# Patient Record
Sex: FEMALE | Race: WHITE | Employment: OTHER
[De-identification: names, ages, dates, MRNs, and addresses within clinical notes are randomized per-mention and may not be internally consistent; named-entity substitution may affect disease eponyms.]

---

## 2017-03-10 ENCOUNTER — OFFICE VISIT (OUTPATIENT)
Dept: FAMILY MEDICINE CLINIC | Facility: CLINIC | Age: 57
End: 2017-03-10

## 2017-03-10 ENCOUNTER — HOSPITAL ENCOUNTER (OUTPATIENT)
Age: 57
Discharge: HOME | End: 2017-03-10
Payer: MEDICARE

## 2017-03-10 VITALS
HEIGHT: 62 IN | TEMPERATURE: 97.2 F | WEIGHT: 181 LBS | BODY MASS INDEX: 33.31 KG/M2 | HEART RATE: 85 BPM | DIASTOLIC BLOOD PRESSURE: 62 MMHG | SYSTOLIC BLOOD PRESSURE: 126 MMHG

## 2017-03-10 DIAGNOSIS — R53.83 FATIGUE, UNSPECIFIED TYPE: ICD-10-CM

## 2017-03-10 DIAGNOSIS — M79.7 FIBROMYALGIA: ICD-10-CM

## 2017-03-10 DIAGNOSIS — R53.83 FATIGUE, UNSPECIFIED TYPE: Primary | ICD-10-CM

## 2017-03-10 LAB
GLUCOSE BLD-MCNC: 124 MG/DL
THYROXINE, FREE: 0.85 NG/DL (ref 0.93–1.7)
TSH SERPL DL<=0.05 MIU/L-ACNC: 1.7 MIU/L (ref 0.3–5)
VITAMIN D 25-HYDROXY: 17.7 NG/ML (ref 30–100)

## 2017-03-10 PROCEDURE — 84439 ASSAY OF FREE THYROXINE: CPT

## 2017-03-10 PROCEDURE — 82306 VITAMIN D 25 HYDROXY: CPT

## 2017-03-10 PROCEDURE — 99213 OFFICE O/P EST LOW 20 MIN: CPT | Performed by: FAMILY MEDICINE

## 2017-03-10 PROCEDURE — 84443 ASSAY THYROID STIM HORMONE: CPT

## 2017-03-10 PROCEDURE — 36415 COLL VENOUS BLD VENIPUNCTURE: CPT

## 2017-03-10 PROCEDURE — 82962 GLUCOSE BLOOD TEST: CPT | Performed by: FAMILY MEDICINE

## 2017-03-10 RX ORDER — RABEPRAZOLE SODIUM 20 MG/1
20 TABLET, DELAYED RELEASE ORAL DAILY
Qty: 30 TABLET | Refills: 2 | Status: CANCELLED | OUTPATIENT
Start: 2017-03-10

## 2017-03-10 RX ORDER — RABEPRAZOLE SODIUM 20 MG/1
20 TABLET, DELAYED RELEASE ORAL DAILY
Qty: 30 TABLET | Refills: 2 | Status: SHIPPED | OUTPATIENT
Start: 2017-03-10 | End: 2017-07-06 | Stop reason: ALTCHOICE

## 2017-03-10 RX ORDER — GABAPENTIN 100 MG/1
100 CAPSULE ORAL DAILY
Qty: 30 CAPSULE | Refills: 1 | Status: SHIPPED | OUTPATIENT
Start: 2017-03-10 | End: 2017-04-06 | Stop reason: SINTOL

## 2017-03-10 ASSESSMENT — PATIENT HEALTH QUESTIONNAIRE - PHQ9
SUM OF ALL RESPONSES TO PHQ QUESTIONS 1-9: 0
2. FEELING DOWN, DEPRESSED OR HOPELESS: 0
SUM OF ALL RESPONSES TO PHQ9 QUESTIONS 1 & 2: 0
1. LITTLE INTEREST OR PLEASURE IN DOING THINGS: 0

## 2017-03-10 ASSESSMENT — ENCOUNTER SYMPTOMS
NAUSEA: 0
BACK PAIN: 1
SHORTNESS OF BREATH: 0
SORE THROAT: 0
ABDOMINAL PAIN: 0

## 2017-03-11 ENCOUNTER — TELEPHONE (OUTPATIENT)
Dept: FAMILY MEDICINE CLINIC | Age: 57
End: 2017-03-11

## 2017-04-05 ENCOUNTER — OFFICE VISIT (OUTPATIENT)
Dept: FAMILY MEDICINE CLINIC | Age: 57
End: 2017-04-05
Payer: MEDICARE

## 2017-04-05 VITALS
HEIGHT: 62 IN | TEMPERATURE: 97.6 F | WEIGHT: 182 LBS | HEART RATE: 72 BPM | RESPIRATION RATE: 16 BRPM | DIASTOLIC BLOOD PRESSURE: 81 MMHG | BODY MASS INDEX: 33.49 KG/M2 | SYSTOLIC BLOOD PRESSURE: 150 MMHG | OXYGEN SATURATION: 98 %

## 2017-04-05 DIAGNOSIS — J06.9 UPPER RESPIRATORY TRACT INFECTION, UNSPECIFIED TYPE: ICD-10-CM

## 2017-04-05 DIAGNOSIS — R07.82 INTERCOSTAL PAIN: ICD-10-CM

## 2017-04-05 DIAGNOSIS — J44.9 CHRONIC OBSTRUCTIVE PULMONARY DISEASE, UNSPECIFIED COPD TYPE (HCC): Primary | ICD-10-CM

## 2017-04-05 DIAGNOSIS — R05.9 COUGH: ICD-10-CM

## 2017-04-05 PROCEDURE — 99214 OFFICE O/P EST MOD 30 MIN: CPT | Performed by: FAMILY MEDICINE

## 2017-04-05 PROCEDURE — 71020 CHG CHEST X-RAY 2 VW: CPT | Performed by: FAMILY MEDICINE

## 2017-04-05 RX ORDER — IPRATROPIUM BROMIDE AND ALBUTEROL SULFATE 2.5; .5 MG/3ML; MG/3ML
1 SOLUTION RESPIRATORY (INHALATION) ONCE
Status: COMPLETED | OUTPATIENT
Start: 2017-04-05 | End: 2017-04-05

## 2017-04-05 RX ORDER — PREDNISONE 50 MG/1
50 TABLET ORAL DAILY
Qty: 7 TABLET | Refills: 0 | Status: SHIPPED | OUTPATIENT
Start: 2017-04-05 | End: 2017-04-12

## 2017-04-05 RX ORDER — BENZONATATE 100 MG/1
100 CAPSULE ORAL 3 TIMES DAILY PRN
Qty: 30 CAPSULE | Refills: 1 | Status: SHIPPED | OUTPATIENT
Start: 2017-04-05 | End: 2017-04-15

## 2017-04-05 RX ORDER — AZITHROMYCIN 250 MG/1
TABLET, FILM COATED ORAL
Qty: 6 TABLET | Refills: 0 | Status: SHIPPED | OUTPATIENT
Start: 2017-04-05 | End: 2017-04-28 | Stop reason: ALTCHOICE

## 2017-04-05 RX ORDER — ALBUTEROL SULFATE 90 UG/1
2 AEROSOL, METERED RESPIRATORY (INHALATION) EVERY 6 HOURS PRN
Qty: 1 INHALER | Refills: 3 | Status: SHIPPED | OUTPATIENT
Start: 2017-04-05

## 2017-04-05 RX ADMIN — IPRATROPIUM BROMIDE AND ALBUTEROL SULFATE 1 AMPULE: 2.5; .5 SOLUTION RESPIRATORY (INHALATION) at 12:25

## 2017-04-05 ASSESSMENT — ENCOUNTER SYMPTOMS
EYES NEGATIVE: 1
SORE THROAT: 1
ALLERGIC/IMMUNOLOGIC NEGATIVE: 1
RHINORRHEA: 0
WHEEZING: 1
SHORTNESS OF BREATH: 1
CHEST TIGHTNESS: 1
GASTROINTESTINAL NEGATIVE: 1
COUGH: 1

## 2017-04-27 ENCOUNTER — HOSPITAL ENCOUNTER (EMERGENCY)
Age: 57
Discharge: HOME | End: 2017-04-27
Attending: EMERGENCY MEDICINE
Payer: COMMERCIAL

## 2017-04-27 ENCOUNTER — APPOINTMENT (OUTPATIENT)
Dept: CT IMAGING | Age: 57
End: 2017-04-27
Payer: COMMERCIAL

## 2017-04-27 ENCOUNTER — APPOINTMENT (OUTPATIENT)
Dept: GENERAL RADIOLOGY | Age: 57
End: 2017-04-27
Payer: COMMERCIAL

## 2017-04-27 VITALS
TEMPERATURE: 98 F | SYSTOLIC BLOOD PRESSURE: 140 MMHG | HEART RATE: 72 BPM | DIASTOLIC BLOOD PRESSURE: 75 MMHG | OXYGEN SATURATION: 97 % | RESPIRATION RATE: 16 BRPM | HEIGHT: 62 IN | WEIGHT: 182.6 LBS | BODY MASS INDEX: 33.6 KG/M2

## 2017-04-27 DIAGNOSIS — S43.401A SPRAIN OF RIGHT SHOULDER, UNSPECIFIED SHOULDER SPRAIN TYPE, INITIAL ENCOUNTER: ICD-10-CM

## 2017-04-27 DIAGNOSIS — S16.1XXA NECK STRAIN, INITIAL ENCOUNTER: Primary | ICD-10-CM
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
## 2017-04-27 DIAGNOSIS — S43.402A SPRAIN OF LEFT SHOULDER, UNSPECIFIED SHOULDER SPRAIN TYPE, INITIAL ENCOUNTER: ICD-10-CM

## 2017-04-27 LAB — GLUCOSE BLD-MCNC: 127 MG/DL (ref 65–105)

## 2017-04-27 PROCEDURE — 73020 X-RAY EXAM OF SHOULDER: CPT

## 2017-04-27 PROCEDURE — 6370000000 HC RX 637 (ALT 250 FOR IP): Performed by: EMERGENCY MEDICINE

## 2017-04-27 PROCEDURE — 82947 ASSAY GLUCOSE BLOOD QUANT: CPT

## 2017-04-27 PROCEDURE — 99284 EMERGENCY DEPT VISIT MOD MDM: CPT

## 2017-04-27 PROCEDURE — 72125 CT NECK SPINE W/O DYE: CPT

## 2017-04-27 PROCEDURE — 70450 CT HEAD/BRAIN W/O DYE: CPT

## 2017-04-27 RX ORDER — IBUPROFEN 400 MG/1
400 TABLET ORAL ONCE
Status: COMPLETED | OUTPATIENT
Start: 2017-04-27 | End: 2017-04-27

## 2017-04-27 RX ORDER — TIZANIDINE 2 MG/1
2 TABLET ORAL EVERY 8 HOURS PRN
Qty: 15 TABLET | Refills: 0 | Status: SHIPPED | OUTPATIENT
Start: 2017-04-27 | End: 2017-04-28 | Stop reason: ALTCHOICE

## 2017-04-27 RX ORDER — HYDROCODONE BITARTRATE AND ACETAMINOPHEN 5; 325 MG/1; MG/1
1 TABLET ORAL EVERY 6 HOURS PRN
Qty: 10 TABLET | Refills: 0 | Status: SHIPPED | OUTPATIENT
Start: 2017-04-27 | End: 2017-04-28 | Stop reason: ALTCHOICE

## 2017-04-27 RX ADMIN — IBUPROFEN 400 MG: 400 TABLET ORAL at 14:39

## 2017-04-27 ASSESSMENT — PAIN SCALES - WONG BAKER: WONGBAKER_NUMERICALRESPONSE: 6

## 2017-04-27 ASSESSMENT — PAIN DESCRIPTION - FREQUENCY: FREQUENCY: CONTINUOUS

## 2017-04-27 ASSESSMENT — ENCOUNTER SYMPTOMS
EYES NEGATIVE: 1
RESPIRATORY NEGATIVE: 1
ALLERGIC/IMMUNOLOGIC NEGATIVE: 1

## 2017-04-27 ASSESSMENT — PAIN SCALES - GENERAL
PAINLEVEL_OUTOF10: 6
PAINLEVEL_OUTOF10: 8
PAINLEVEL_OUTOF10: 8

## 2017-04-27 ASSESSMENT — VISUAL ACUITY
OU: 20/20
OS: 20/20
OD: 20/40

## 2017-04-27 ASSESSMENT — PAIN DESCRIPTION - DESCRIPTORS: DESCRIPTORS: ACHING;THROBBING

## 2017-04-27 ASSESSMENT — PAIN DESCRIPTION - LOCATION: LOCATION: HEAD;NECK

## 2017-04-28 ENCOUNTER — OFFICE VISIT (OUTPATIENT)
Dept: FAMILY MEDICINE CLINIC | Age: 57
End: 2017-04-28
Payer: MEDICARE

## 2017-04-28 VITALS
DIASTOLIC BLOOD PRESSURE: 62 MMHG | HEIGHT: 62 IN | HEART RATE: 77 BPM | WEIGHT: 180 LBS | TEMPERATURE: 97.9 F | SYSTOLIC BLOOD PRESSURE: 128 MMHG | BODY MASS INDEX: 33.13 KG/M2

## 2017-04-28 DIAGNOSIS — K21.9 GASTROESOPHAGEAL REFLUX DISEASE WITHOUT ESOPHAGITIS: ICD-10-CM

## 2017-04-28 DIAGNOSIS — E11.9 NEW ONSET TYPE 2 DIABETES MELLITUS (HCC): Primary | ICD-10-CM

## 2017-04-28 LAB — HBA1C MFR BLD: 7.9 %

## 2017-04-28 PROCEDURE — 83036 HEMOGLOBIN GLYCOSYLATED A1C: CPT | Performed by: FAMILY MEDICINE

## 2017-04-28 PROCEDURE — 99213 OFFICE O/P EST LOW 20 MIN: CPT | Performed by: FAMILY MEDICINE

## 2017-04-28 RX ORDER — METFORMIN HYDROCHLORIDE 500 MG/1
500 TABLET, EXTENDED RELEASE ORAL
Qty: 30 TABLET | Refills: 3 | Status: SHIPPED | OUTPATIENT
Start: 2017-04-28 | End: 2017-05-26 | Stop reason: DRUGHIGH

## 2017-04-28 ASSESSMENT — ENCOUNTER SYMPTOMS
SHORTNESS OF BREATH: 0
ABDOMINAL PAIN: 0
SORE THROAT: 0
CONSTIPATION: 0
NAUSEA: 0

## 2017-04-28 ASSESSMENT — PATIENT HEALTH QUESTIONNAIRE - PHQ9
2. FEELING DOWN, DEPRESSED OR HOPELESS: 0
SUM OF ALL RESPONSES TO PHQ QUESTIONS 1-9: 0
SUM OF ALL RESPONSES TO PHQ9 QUESTIONS 1 & 2: 0
1. LITTLE INTEREST OR PLEASURE IN DOING THINGS: 0

## 2017-05-12 ENCOUNTER — OFFICE VISIT (OUTPATIENT)
Dept: FAMILY MEDICINE CLINIC | Age: 57
End: 2017-05-12
Payer: MEDICARE

## 2017-05-12 ENCOUNTER — HOSPITAL ENCOUNTER (OUTPATIENT)
Age: 57
Setting detail: SPECIMEN
Discharge: HOME | End: 2017-05-12
Payer: MEDICARE

## 2017-05-12 VITALS
HEIGHT: 62 IN | BODY MASS INDEX: 32.94 KG/M2 | HEART RATE: 61 BPM | OXYGEN SATURATION: 98 % | DIASTOLIC BLOOD PRESSURE: 62 MMHG | SYSTOLIC BLOOD PRESSURE: 120 MMHG | WEIGHT: 179 LBS | TEMPERATURE: 98.3 F

## 2017-05-12 DIAGNOSIS — E11.9 TYPE 2 DIABETES MELLITUS WITHOUT COMPLICATION, WITHOUT LONG-TERM CURRENT USE OF INSULIN (HCC): ICD-10-CM

## 2017-05-12 DIAGNOSIS — E11.9 TYPE 2 DIABETES MELLITUS WITHOUT COMPLICATION, WITHOUT LONG-TERM CURRENT USE OF INSULIN (HCC): Primary | ICD-10-CM

## 2017-05-12 DIAGNOSIS — R06.83 SNORING: ICD-10-CM

## 2017-05-12 DIAGNOSIS — Z12.11 COLON CANCER SCREENING: ICD-10-CM

## 2017-05-12 LAB
CREATININE URINE: 222.9 MG/DL (ref 28–217)
MICROALBUMIN/CREAT 24H UR: <12 MG/L
MICROALBUMIN/CREAT UR-RTO: 5 MCG/MG CREAT

## 2017-05-12 PROCEDURE — 99213 OFFICE O/P EST LOW 20 MIN: CPT | Performed by: FAMILY MEDICINE

## 2017-05-12 RX ORDER — LANCETS 33 GAUGE
EACH MISCELLANEOUS
Refills: 1 | COMMUNITY
Start: 2017-04-28

## 2017-05-12 RX ORDER — BLOOD-GLUCOSE METER
EACH MISCELLANEOUS
Refills: 0 | COMMUNITY
Start: 2017-04-28

## 2017-05-12 ASSESSMENT — ENCOUNTER SYMPTOMS
CONSTIPATION: 0
SHORTNESS OF BREATH: 0
SORE THROAT: 0
VOMITING: 0
NAUSEA: 1
ABDOMINAL PAIN: 0

## 2017-05-12 ASSESSMENT — PATIENT HEALTH QUESTIONNAIRE - PHQ9
SUM OF ALL RESPONSES TO PHQ QUESTIONS 1-9: 0
2. FEELING DOWN, DEPRESSED OR HOPELESS: 0
1. LITTLE INTEREST OR PLEASURE IN DOING THINGS: 0
SUM OF ALL RESPONSES TO PHQ9 QUESTIONS 1 & 2: 0

## 2017-05-15 ENCOUNTER — HOSPITAL ENCOUNTER (OUTPATIENT)
Dept: SLEEP CENTER | Age: 57
Discharge: HOME | End: 2017-05-15
Payer: MEDICARE

## 2017-05-15 VITALS
DIASTOLIC BLOOD PRESSURE: 70 MMHG | BODY MASS INDEX: 33.79 KG/M2 | SYSTOLIC BLOOD PRESSURE: 125 MMHG | HEIGHT: 61 IN | WEIGHT: 179 LBS

## 2017-05-15 DIAGNOSIS — G47.33 OSA (OBSTRUCTIVE SLEEP APNEA): Primary | ICD-10-CM

## 2017-05-15 PROCEDURE — 95810 POLYSOM 6/> YRS 4/> PARAM: CPT

## 2017-05-15 ASSESSMENT — SLEEP AND FATIGUE QUESTIONNAIRES
HOW LIKELY ARE YOU TO NOD OFF OR FALL ASLEEP WHILE SITTING QUIETLY AFTER LUNCH WITHOUT ALCOHOL: 0
HOW LIKELY ARE YOU TO NOD OFF OR FALL ASLEEP WHILE SITTING AND TALKING TO SOMEONE: 0
HOW LIKELY ARE YOU TO NOD OFF OR FALL ASLEEP WHILE SITTING AND READING: 2
HOW LIKELY ARE YOU TO NOD OFF OR FALL ASLEEP IN A CAR, WHILE STOPPED FOR A FEW MINUTES IN TRAFFIC: 0
HOW LIKELY ARE YOU TO NOD OFF OR FALL ASLEEP WHILE WATCHING TV: 3
HOW LIKELY ARE YOU TO NOD OFF OR FALL ASLEEP WHILE SITTING INACTIVE IN A PUBLIC PLACE: 0
HOW LIKELY ARE YOU TO NOD OFF OR FALL ASLEEP WHEN YOU ARE A PASSENGER IN A CAR FOR AN HOUR WITHOUT A BREAK: 3
ESS TOTAL SCORE: 11
NECK CIRCUMFERENCE (INCHES): 39
HOW LIKELY ARE YOU TO NOD OFF OR FALL ASLEEP WHILE LYING DOWN TO REST IN THE AFTERNOON WHEN CIRCUMSTANCES PERMIT: 3

## 2017-05-26 ENCOUNTER — OFFICE VISIT (OUTPATIENT)
Dept: FAMILY MEDICINE CLINIC | Age: 57
End: 2017-05-26
Payer: MEDICARE

## 2017-05-26 VITALS
WEIGHT: 183 LBS | DIASTOLIC BLOOD PRESSURE: 62 MMHG | TEMPERATURE: 97.9 F | SYSTOLIC BLOOD PRESSURE: 128 MMHG | HEART RATE: 78 BPM | HEIGHT: 61 IN | BODY MASS INDEX: 34.55 KG/M2

## 2017-05-26 DIAGNOSIS — E11.9 TYPE 2 DIABETES MELLITUS WITHOUT COMPLICATION, WITHOUT LONG-TERM CURRENT USE OF INSULIN (HCC): Primary | ICD-10-CM

## 2017-05-26 DIAGNOSIS — K21.9 GASTROESOPHAGEAL REFLUX DISEASE WITHOUT ESOPHAGITIS: ICD-10-CM

## 2017-05-26 DIAGNOSIS — Z12.11 COLON CANCER SCREENING: ICD-10-CM

## 2017-05-26 LAB
CONTROL: NORMAL
HEMOCCULT STL QL: NORMAL

## 2017-05-26 PROCEDURE — 99213 OFFICE O/P EST LOW 20 MIN: CPT | Performed by: FAMILY MEDICINE

## 2017-05-26 PROCEDURE — 82274 ASSAY TEST FOR BLOOD FECAL: CPT | Performed by: FAMILY MEDICINE

## 2017-05-26 ASSESSMENT — PATIENT HEALTH QUESTIONNAIRE - PHQ9
SUM OF ALL RESPONSES TO PHQ9 QUESTIONS 1 & 2: 0
SUM OF ALL RESPONSES TO PHQ QUESTIONS 1-9: 0
1. LITTLE INTEREST OR PLEASURE IN DOING THINGS: 0
2. FEELING DOWN, DEPRESSED OR HOPELESS: 0

## 2017-05-26 ASSESSMENT — ENCOUNTER SYMPTOMS
SORE THROAT: 0
SHORTNESS OF BREATH: 0
CONSTIPATION: 0
NAUSEA: 0
ABDOMINAL PAIN: 0

## 2017-05-27 DIAGNOSIS — R07.9 CHEST PAIN, UNSPECIFIED TYPE: ICD-10-CM

## 2017-06-08 ENCOUNTER — TELEPHONE (OUTPATIENT)
Dept: FAMILY MEDICINE CLINIC | Age: 57
End: 2017-06-08

## 2017-06-08 RX ORDER — AZITHROMYCIN 500 MG/1
500 TABLET, FILM COATED ORAL DAILY
Qty: 1 PACKET | Refills: 0 | Status: SHIPPED | OUTPATIENT
Start: 2017-06-08 | End: 2017-06-11

## 2017-06-08 RX ORDER — BENZONATATE 200 MG/1
200 CAPSULE ORAL 3 TIMES DAILY
Qty: 21 CAPSULE | Refills: 0 | Status: SHIPPED | OUTPATIENT
Start: 2017-06-08 | End: 2017-06-14 | Stop reason: ALTCHOICE

## 2017-06-14 ENCOUNTER — OFFICE VISIT (OUTPATIENT)
Dept: FAMILY MEDICINE CLINIC | Age: 57
End: 2017-06-14
Payer: MEDICARE

## 2017-06-14 VITALS
OXYGEN SATURATION: 96 % | HEART RATE: 69 BPM | WEIGHT: 182.98 LBS | HEIGHT: 61 IN | DIASTOLIC BLOOD PRESSURE: 78 MMHG | BODY MASS INDEX: 34.55 KG/M2 | TEMPERATURE: 97.6 F | SYSTOLIC BLOOD PRESSURE: 125 MMHG | RESPIRATION RATE: 16 BRPM

## 2017-06-14 DIAGNOSIS — R09.82 POSTNASAL DRIP: ICD-10-CM

## 2017-06-14 DIAGNOSIS — J06.9 UPPER RESPIRATORY TRACT INFECTION, UNSPECIFIED TYPE: ICD-10-CM

## 2017-06-14 DIAGNOSIS — R05.9 COUGH: Primary | ICD-10-CM

## 2017-06-14 PROCEDURE — 99213 OFFICE O/P EST LOW 20 MIN: CPT | Performed by: FAMILY MEDICINE

## 2017-06-14 RX ORDER — FLUTICASONE PROPIONATE 50 MCG
1 SPRAY, SUSPENSION (ML) NASAL 2 TIMES DAILY
Qty: 1 BOTTLE | Refills: 2 | Status: SHIPPED | OUTPATIENT
Start: 2017-06-14 | End: 2017-09-20 | Stop reason: ALTCHOICE

## 2017-06-14 RX ORDER — AZITHROMYCIN 250 MG/1
TABLET, FILM COATED ORAL
Qty: 6 TABLET | Refills: 0 | Status: SHIPPED | OUTPATIENT
Start: 2017-06-14 | End: 2017-07-20 | Stop reason: ALTCHOICE

## 2017-06-14 RX ORDER — BROMPHENIRAMINE MALEATE, PSEUDOEPHEDRINE HYDROCHLORIDE, AND DEXTROMETHORPHAN HYDROBROMIDE 2; 30; 10 MG/5ML; MG/5ML; MG/5ML
5 SYRUP ORAL 3 TIMES DAILY
Qty: 180 ML | Refills: 0 | Status: SHIPPED | OUTPATIENT
Start: 2017-06-14 | End: 2017-07-20 | Stop reason: ALTCHOICE

## 2017-06-14 ASSESSMENT — ENCOUNTER SYMPTOMS
EYES NEGATIVE: 1
SINUS PRESSURE: 1
WHEEZING: 1
ALLERGIC/IMMUNOLOGIC NEGATIVE: 1
SHORTNESS OF BREATH: 1
GASTROINTESTINAL NEGATIVE: 1
RHINORRHEA: 1
STRIDOR: 0
COUGH: 1

## 2017-06-14 ASSESSMENT — PATIENT HEALTH QUESTIONNAIRE - PHQ9
1. LITTLE INTEREST OR PLEASURE IN DOING THINGS: 0
SUM OF ALL RESPONSES TO PHQ9 QUESTIONS 1 & 2: 0
2. FEELING DOWN, DEPRESSED OR HOPELESS: 0
SUM OF ALL RESPONSES TO PHQ QUESTIONS 1-9: 0

## 2017-07-06 ENCOUNTER — OFFICE VISIT (OUTPATIENT)
Dept: FAMILY MEDICINE CLINIC | Age: 57
End: 2017-07-06
Payer: MEDICARE

## 2017-07-06 VITALS
SYSTOLIC BLOOD PRESSURE: 114 MMHG | WEIGHT: 183 LBS | OXYGEN SATURATION: 98 % | HEART RATE: 61 BPM | HEIGHT: 61 IN | BODY MASS INDEX: 34.55 KG/M2 | DIASTOLIC BLOOD PRESSURE: 80 MMHG

## 2017-07-06 DIAGNOSIS — K21.9 GASTROESOPHAGEAL REFLUX DISEASE WITHOUT ESOPHAGITIS: ICD-10-CM

## 2017-07-06 DIAGNOSIS — H92.21 BLOOD IN EAR CANAL, RIGHT: Primary | ICD-10-CM

## 2017-07-06 DIAGNOSIS — E11.9 TYPE 2 DIABETES MELLITUS WITHOUT COMPLICATION, WITHOUT LONG-TERM CURRENT USE OF INSULIN (HCC): ICD-10-CM

## 2017-07-06 PROCEDURE — 99213 OFFICE O/P EST LOW 20 MIN: CPT | Performed by: FAMILY MEDICINE

## 2017-07-06 RX ORDER — RANITIDINE 150 MG/1
150 TABLET ORAL 2 TIMES DAILY
COMMUNITY
End: 2022-06-07

## 2017-07-06 RX ORDER — GLIMEPIRIDE 2 MG/1
2 TABLET ORAL EVERY MORNING
Qty: 30 TABLET | Refills: 3 | Status: SHIPPED | OUTPATIENT
Start: 2017-07-06 | End: 2017-10-07 | Stop reason: DRUGHIGH

## 2017-07-06 RX ORDER — GLIMEPIRIDE 2 MG/1
2 TABLET ORAL
COMMUNITY
End: 2017-07-06 | Stop reason: SDUPTHER

## 2017-07-06 ASSESSMENT — ENCOUNTER SYMPTOMS
ABDOMINAL PAIN: 0
SHORTNESS OF BREATH: 0
SORE THROAT: 1
CONSTIPATION: 0
NAUSEA: 0
SINUS PRESSURE: 1

## 2017-07-20 ENCOUNTER — OFFICE VISIT (OUTPATIENT)
Dept: FAMILY MEDICINE CLINIC | Age: 57
End: 2017-07-20
Payer: COMMERCIAL

## 2017-07-20 VITALS
DIASTOLIC BLOOD PRESSURE: 70 MMHG | HEIGHT: 62 IN | OXYGEN SATURATION: 97 % | HEART RATE: 75 BPM | TEMPERATURE: 97.7 F | BODY MASS INDEX: 33.6 KG/M2 | SYSTOLIC BLOOD PRESSURE: 120 MMHG | WEIGHT: 182.6 LBS

## 2017-07-20 DIAGNOSIS — M25.551 PAIN IN RIGHT HIP: ICD-10-CM

## 2017-07-20 DIAGNOSIS — E11.9 TYPE 2 DIABETES MELLITUS WITHOUT COMPLICATION, WITHOUT LONG-TERM CURRENT USE OF INSULIN (HCC): Primary | ICD-10-CM

## 2017-07-20 PROCEDURE — 99213 OFFICE O/P EST LOW 20 MIN: CPT | Performed by: FAMILY MEDICINE

## 2017-07-20 RX ORDER — IBUPROFEN 200 MG
800 TABLET ORAL PRN
COMMUNITY

## 2017-07-20 RX ORDER — MONTELUKAST SODIUM 10 MG/1
10 TABLET ORAL DAILY
Qty: 30 TABLET | Refills: 3 | Status: SHIPPED | OUTPATIENT
Start: 2017-07-20 | End: 2018-03-02 | Stop reason: ALTCHOICE

## 2017-07-20 ASSESSMENT — ENCOUNTER SYMPTOMS
SORE THROAT: 0
ABDOMINAL PAIN: 0
SHORTNESS OF BREATH: 0
NAUSEA: 0

## 2017-07-27 ENCOUNTER — OFFICE VISIT (OUTPATIENT)
Dept: ORTHOPEDIC SURGERY | Age: 57
End: 2017-07-27
Payer: COMMERCIAL

## 2017-07-27 DIAGNOSIS — M48.061 LUMBAR STENOSIS: ICD-10-CM

## 2017-07-27 DIAGNOSIS — M65.331 TRIGGER MIDDLE FINGER OF RIGHT HAND: ICD-10-CM

## 2017-07-27 DIAGNOSIS — M25.551 HIP PAIN, RIGHT: Primary | ICD-10-CM

## 2017-07-27 DIAGNOSIS — G89.29 CHRONIC MIDLINE LOW BACK PAIN WITHOUT SCIATICA: ICD-10-CM

## 2017-07-27 DIAGNOSIS — M54.50 CHRONIC MIDLINE LOW BACK PAIN WITHOUT SCIATICA: ICD-10-CM

## 2017-07-27 DIAGNOSIS — M54.10 RADICULAR LEG PAIN: ICD-10-CM

## 2017-07-27 PROBLEM — M70.61 TROCHANTERIC BURSITIS OF RIGHT HIP: Status: ACTIVE | Noted: 2017-07-27

## 2017-07-27 PROCEDURE — 99203 OFFICE O/P NEW LOW 30 MIN: CPT | Performed by: ORTHOPAEDIC SURGERY

## 2017-07-27 PROCEDURE — 20600 DRAIN/INJ JOINT/BURSA W/O US: CPT | Performed by: ORTHOPAEDIC SURGERY

## 2017-07-27 RX ORDER — BETAMETHASONE SODIUM PHOSPHATE AND BETAMETHASONE ACETATE 3; 3 MG/ML; MG/ML
12 INJECTION, SUSPENSION INTRA-ARTICULAR; INTRALESIONAL; INTRAMUSCULAR; SOFT TISSUE ONCE
Status: SHIPPED | OUTPATIENT
Start: 2017-07-27

## 2017-07-27 RX ORDER — LIDOCAINE HYDROCHLORIDE 10 MG/ML
2 INJECTION, SOLUTION EPIDURAL; INFILTRATION; INTRACAUDAL; PERINEURAL ONCE
Status: SHIPPED | OUTPATIENT
Start: 2017-07-27

## 2017-07-27 RX ORDER — LIDOCAINE HYDROCHLORIDE 10 MG/ML
1 INJECTION, SOLUTION EPIDURAL; INFILTRATION; INTRACAUDAL; PERINEURAL ONCE
Status: COMPLETED | OUTPATIENT
Start: 2017-07-27 | End: 2017-07-27

## 2017-07-27 RX ORDER — BUPIVACAINE HYDROCHLORIDE 5 MG/ML
2 INJECTION, SOLUTION PERINEURAL ONCE
Status: SHIPPED | OUTPATIENT
Start: 2017-07-27

## 2017-07-27 RX ORDER — BETAMETHASONE SODIUM PHOSPHATE AND BETAMETHASONE ACETATE 3; 3 MG/ML; MG/ML
6 INJECTION, SUSPENSION INTRA-ARTICULAR; INTRALESIONAL; INTRAMUSCULAR; SOFT TISSUE ONCE
Status: COMPLETED | OUTPATIENT
Start: 2017-07-27 | End: 2017-07-27

## 2017-07-27 RX ADMIN — LIDOCAINE HYDROCHLORIDE 1 ML: 10 INJECTION, SOLUTION EPIDURAL; INFILTRATION; INTRACAUDAL; PERINEURAL at 14:37

## 2017-07-27 RX ADMIN — BETAMETHASONE SODIUM PHOSPHATE AND BETAMETHASONE ACETATE 6 MG: 3; 3 INJECTION, SUSPENSION INTRA-ARTICULAR; INTRALESIONAL; INTRAMUSCULAR; SOFT TISSUE at 14:36

## 2017-07-27 ASSESSMENT — ENCOUNTER SYMPTOMS: BACK PAIN: 1

## 2017-08-17 ENCOUNTER — TELEPHONE (OUTPATIENT)
Dept: ORTHOPEDIC SURGERY | Age: 57
End: 2017-08-17

## 2017-08-17 DIAGNOSIS — G89.29 CHRONIC MIDLINE LOW BACK PAIN WITHOUT SCIATICA: Primary | ICD-10-CM

## 2017-08-17 DIAGNOSIS — M54.50 CHRONIC MIDLINE LOW BACK PAIN WITHOUT SCIATICA: Primary | ICD-10-CM

## 2017-08-17 DIAGNOSIS — M48.061 LUMBAR STENOSIS: ICD-10-CM

## 2017-08-17 DIAGNOSIS — M54.10 RADICULAR LEG PAIN: ICD-10-CM

## 2017-08-25 ENCOUNTER — HOSPITAL ENCOUNTER (OUTPATIENT)
Dept: MRI IMAGING | Age: 57
Discharge: HOME | End: 2017-08-25
Payer: MEDICARE

## 2017-08-25 DIAGNOSIS — M48.061 LUMBAR STENOSIS: ICD-10-CM

## 2017-08-25 DIAGNOSIS — G89.29 CHRONIC MIDLINE LOW BACK PAIN WITHOUT SCIATICA: ICD-10-CM

## 2017-08-25 DIAGNOSIS — M54.50 CHRONIC MIDLINE LOW BACK PAIN WITHOUT SCIATICA: ICD-10-CM

## 2017-08-25 DIAGNOSIS — M54.10 RADICULAR LEG PAIN: ICD-10-CM

## 2017-08-25 PROCEDURE — 72148 MRI LUMBAR SPINE W/O DYE: CPT

## 2017-08-30 ENCOUNTER — TELEPHONE (OUTPATIENT)
Dept: ORTHOPEDIC SURGERY | Age: 57
End: 2017-08-30

## 2017-09-14 ENCOUNTER — OFFICE VISIT (OUTPATIENT)
Dept: ORTHOPEDIC SURGERY | Age: 57
End: 2017-09-14
Payer: MEDICARE

## 2017-09-14 DIAGNOSIS — G89.29 CHRONIC MIDLINE LOW BACK PAIN WITHOUT SCIATICA: Primary | ICD-10-CM

## 2017-09-14 DIAGNOSIS — M48.061 LUMBAR STENOSIS: ICD-10-CM

## 2017-09-14 DIAGNOSIS — M65.331 TRIGGER MIDDLE FINGER OF RIGHT HAND: ICD-10-CM

## 2017-09-14 DIAGNOSIS — M54.50 CHRONIC MIDLINE LOW BACK PAIN WITHOUT SCIATICA: Primary | ICD-10-CM

## 2017-09-14 PROCEDURE — 99213 OFFICE O/P EST LOW 20 MIN: CPT | Performed by: ORTHOPAEDIC SURGERY

## 2017-09-18 ENCOUNTER — TELEPHONE (OUTPATIENT)
Dept: FAMILY MEDICINE CLINIC | Age: 57
End: 2017-09-18

## 2017-09-18 DIAGNOSIS — K86.2 PANCREAS CYST: Primary | ICD-10-CM

## 2017-09-20 ENCOUNTER — HOSPITAL ENCOUNTER (OUTPATIENT)
Dept: PAIN MANAGEMENT | Age: 57
Discharge: HOME | End: 2017-09-20
Payer: MEDICARE

## 2017-09-20 VITALS
HEIGHT: 62 IN | HEART RATE: 64 BPM | WEIGHT: 182 LBS | TEMPERATURE: 97.7 F | BODY MASS INDEX: 33.49 KG/M2 | RESPIRATION RATE: 16 BRPM | DIASTOLIC BLOOD PRESSURE: 78 MMHG | OXYGEN SATURATION: 100 % | SYSTOLIC BLOOD PRESSURE: 149 MMHG

## 2017-09-20 DIAGNOSIS — M47.26 OSTEOARTHRITIS OF SPINE WITH RADICULOPATHY, LUMBAR REGION: ICD-10-CM

## 2017-09-20 DIAGNOSIS — M54.16 LUMBAR RADICULOPATHY: Primary | ICD-10-CM

## 2017-09-20 DIAGNOSIS — M25.551 HIP PAIN, RIGHT: ICD-10-CM

## 2017-09-20 DIAGNOSIS — M48.061 DEGENERATIVE LUMBAR SPINAL STENOSIS: ICD-10-CM

## 2017-09-20 DIAGNOSIS — M47.816 ARTHROPATHY OF LUMBAR FACET JOINT: ICD-10-CM

## 2017-09-20 PROCEDURE — 99204 OFFICE O/P NEW MOD 45 MIN: CPT

## 2017-09-20 PROCEDURE — 80307 DRUG TEST PRSMV CHEM ANLYZR: CPT

## 2017-09-20 PROCEDURE — 99204 OFFICE O/P NEW MOD 45 MIN: CPT | Performed by: PAIN MEDICINE

## 2017-09-20 RX ORDER — ZOLPIDEM TARTRATE 10 MG/1
5 TABLET ORAL NIGHTLY PRN
COMMUNITY

## 2017-09-20 RX ORDER — BUTALBITAL, ACETAMINOPHEN AND CAFFEINE 300; 40; 50 MG/1; MG/1; MG/1
CAPSULE ORAL
Refills: 0 | COMMUNITY
Start: 2017-08-23 | End: 2017-12-26 | Stop reason: ALTCHOICE

## 2017-09-20 RX ORDER — COVID-19 ANTIGEN TEST
220 KIT MISCELLANEOUS DAILY PRN
COMMUNITY

## 2017-09-20 RX ORDER — ONDANSETRON 4 MG/1
TABLET, ORALLY DISINTEGRATING ORAL
Refills: 0 | COMMUNITY
Start: 2017-08-23

## 2017-09-20 RX ORDER — ACETAMINOPHEN, ASPIRIN AND CAFFEINE 250; 250; 65 MG/1; MG/1; MG/1
1 TABLET, FILM COATED ORAL DAILY PRN
COMMUNITY

## 2017-09-20 ASSESSMENT — ENCOUNTER SYMPTOMS
NAUSEA: 1
CONSTIPATION: 1
SHORTNESS OF BREATH: 1
BACK PAIN: 1
HEARTBURN: 0
DOUBLE VISION: 1
ORTHOPNEA: 0

## 2017-09-20 ASSESSMENT — PAIN DESCRIPTION - DIRECTION: RADIATING_TOWARDS: DOWN BOTH LEGS

## 2017-09-20 ASSESSMENT — PAIN DESCRIPTION - PROGRESSION: CLINICAL_PROGRESSION: GRADUALLY WORSENING

## 2017-09-20 ASSESSMENT — PAIN DESCRIPTION - PAIN TYPE: TYPE: CHRONIC PAIN

## 2017-09-20 ASSESSMENT — PAIN DESCRIPTION - ONSET: ONSET: ON-GOING

## 2017-09-20 ASSESSMENT — PAIN SCALES - GENERAL: PAINLEVEL_OUTOF10: 5

## 2017-09-20 ASSESSMENT — PAIN DESCRIPTION - FREQUENCY: FREQUENCY: CONTINUOUS

## 2017-09-20 ASSESSMENT — PAIN DESCRIPTION - LOCATION: LOCATION: BACK;BUTTOCKS;LEG;KNEE

## 2017-09-20 ASSESSMENT — PAIN DESCRIPTION - ORIENTATION: ORIENTATION: LEFT;RIGHT;LOWER

## 2017-09-24 LAB
6-ACETYLMORPHINE, UR: NOT DETECTED
7-AMINOCLONAZEPAM, URINE: NOT DETECTED
ALPHA-OH-ALPRAZ, URINE: NOT DETECTED
ALPRAZOLAM, URINE: NOT DETECTED
AMPHETAMINES, URINE: NOT DETECTED
BARBITURATES, URINE: NOT DETECTED
BENZOYLECGONINE, UR: NOT DETECTED
BUPRENORPHINE URINE: NOT DETECTED
CARISOPRODOL, UR: NOT DETECTED
CLONAZEPAM, URINE: NOT DETECTED
CODEINE, URINE: NOT DETECTED
CREATININE URINE: 62.9 MG/DL (ref 20–400)
DIAZEPAM, URINE: NOT DETECTED
DRUGS EXPECTED, UR: NORMAL
EER HI RES INTERP UR: NORMAL
ETHYL GLUCURONIDE UR: NOT DETECTED
FENTANYL URINE: NOT DETECTED
HYDROCODONE, URINE: NOT DETECTED
HYDROMORPHONE, URINE: NOT DETECTED
LORAZEPAM, URINE: NOT DETECTED
MARIJUANA METAB, UR: NOT DETECTED
MDA, UR: NOT DETECTED
MDEA, EVE, UR: NOT DETECTED
MDMA URINE: NOT DETECTED
MEPERIDINE METAB, UR: PRESENT
METHADONE, URINE: NOT DETECTED
METHAMPHETAMINE, URINE: NOT DETECTED
METHYLPHENIDATE: NOT DETECTED
MIDAZOLAM, URINE: NOT DETECTED
MORPHINE URINE: NOT DETECTED
NORBUPRENORPHINE, URINE: NOT DETECTED
NORDIAZEPAM, URINE: NOT DETECTED
NORFENTANYL, URINE: NOT DETECTED
NORHYDROCODONE, URINE: NOT DETECTED
NOROXYCODONE, URINE: NOT DETECTED
NOROXYMORPHONE, URINE: NOT DETECTED
OXAZEPAM, URINE: NOT DETECTED
OXYCODONE URINE: NOT DETECTED
OXYMORPHONE, URINE: NOT DETECTED
PAIN MANAGEMENT DRUG PANEL INTERP, URINE: NORMAL
PAIN MGT DRUG PANEL, HI RES, UR: NORMAL
PCP,URINE: NOT DETECTED
PHENTERMINE, UR: NOT DETECTED
PROPOXYPHENE, URINE: NOT DETECTED
TAPENTADOL, URINE: NOT DETECTED
TAPENTADOL-O-SULFATE, URINE: NOT DETECTED
TEMAZEPAM, URINE: NOT DETECTED
TRAMADOL, URINE: NOT DETECTED
ZOLPIDEM, URINE: PRESENT

## 2017-09-25 ENCOUNTER — HOSPITAL ENCOUNTER (OUTPATIENT)
Dept: CT IMAGING | Age: 57
Discharge: HOME | End: 2017-09-25
Payer: MEDICARE

## 2017-09-25 DIAGNOSIS — K86.2 PANCREAS CYST: ICD-10-CM

## 2017-09-25 PROCEDURE — 74176 CT ABD & PELVIS W/O CONTRAST: CPT

## 2017-09-30 ENCOUNTER — OFFICE VISIT (OUTPATIENT)
Dept: FAMILY MEDICINE CLINIC | Age: 57
End: 2017-09-30
Payer: MEDICARE

## 2017-09-30 VITALS
HEIGHT: 62 IN | TEMPERATURE: 98.1 F | BODY MASS INDEX: 33.68 KG/M2 | WEIGHT: 183 LBS | DIASTOLIC BLOOD PRESSURE: 83 MMHG | HEART RATE: 62 BPM | RESPIRATION RATE: 16 BRPM | SYSTOLIC BLOOD PRESSURE: 155 MMHG | OXYGEN SATURATION: 96 %

## 2017-09-30 DIAGNOSIS — H66.91 RIGHT OTITIS MEDIA, UNSPECIFIED CHRONICITY, UNSPECIFIED OTITIS MEDIA TYPE: ICD-10-CM

## 2017-09-30 DIAGNOSIS — J02.9 SORE THROAT: Primary | ICD-10-CM

## 2017-09-30 DIAGNOSIS — G43.909 MIGRAINE WITHOUT STATUS MIGRAINOSUS, NOT INTRACTABLE, UNSPECIFIED MIGRAINE TYPE: ICD-10-CM

## 2017-09-30 LAB — S PYO AG THROAT QL: NORMAL

## 2017-09-30 PROCEDURE — 96372 THER/PROPH/DIAG INJ SC/IM: CPT | Performed by: FAMILY MEDICINE

## 2017-09-30 PROCEDURE — 99213 OFFICE O/P EST LOW 20 MIN: CPT | Performed by: FAMILY MEDICINE

## 2017-09-30 PROCEDURE — 87880 STREP A ASSAY W/OPTIC: CPT | Performed by: FAMILY MEDICINE

## 2017-09-30 RX ORDER — AZITHROMYCIN 250 MG/1
250 TABLET, FILM COATED ORAL DAILY
Qty: 6 TABLET | Refills: 0 | Status: SHIPPED | OUTPATIENT
Start: 2017-09-30 | End: 2017-10-05

## 2017-09-30 RX ORDER — KETOROLAC TROMETHAMINE 30 MG/ML
60 INJECTION, SOLUTION INTRAMUSCULAR; INTRAVENOUS ONCE
Status: COMPLETED | OUTPATIENT
Start: 2017-09-30 | End: 2017-09-30

## 2017-09-30 RX ADMIN — KETOROLAC TROMETHAMINE 60 MG: 30 INJECTION, SOLUTION INTRAMUSCULAR; INTRAVENOUS at 15:30

## 2017-09-30 ASSESSMENT — ENCOUNTER SYMPTOMS
ABDOMINAL PAIN: 1
EYE ITCHING: 0
SHORTNESS OF BREATH: 0
BACK PAIN: 0
DIARRHEA: 0
VISUAL CHANGE: 0
EYE DISCHARGE: 0
CHEST TIGHTNESS: 0
PHOTOPHOBIA: 1
SORE THROAT: 1
CHANGE IN BOWEL HABIT: 1
SINUS PRESSURE: 1
WHEEZING: 0
CONSTIPATION: 1
RHINORRHEA: 0
TROUBLE SWALLOWING: 1
VOMITING: 0
VOICE CHANGE: 0
NAUSEA: 1
COUGH: 1
EYE REDNESS: 0

## 2017-10-02 ENCOUNTER — HOSPITAL ENCOUNTER (OUTPATIENT)
Dept: PAIN MANAGEMENT | Age: 57
Discharge: HOME | End: 2017-10-02
Payer: MEDICARE

## 2017-10-03 ENCOUNTER — HOSPITAL ENCOUNTER (EMERGENCY)
Age: 57
Discharge: HOME | End: 2017-10-03
Attending: EMERGENCY MEDICINE
Payer: MEDICARE

## 2017-10-03 VITALS
RESPIRATION RATE: 16 BRPM | HEART RATE: 72 BPM | BODY MASS INDEX: 33.49 KG/M2 | OXYGEN SATURATION: 94 % | SYSTOLIC BLOOD PRESSURE: 144 MMHG | HEIGHT: 62 IN | TEMPERATURE: 98.3 F | WEIGHT: 182 LBS | DIASTOLIC BLOOD PRESSURE: 65 MMHG

## 2017-10-03 DIAGNOSIS — G43.001 MIGRAINE WITHOUT AURA AND WITH STATUS MIGRAINOSUS, NOT INTRACTABLE: Primary | ICD-10-CM

## 2017-10-03 LAB
ABSOLUTE EOS #: 0.13 K/UL (ref 0–0.4)
ABSOLUTE LYMPH #: 3.35 K/UL (ref 1–4.8)
ABSOLUTE MONO #: 0.2 K/UL (ref 0.1–1.3)
ANION GAP SERPL CALCULATED.3IONS-SCNC: 12 MMOL/L (ref 9–17)
BASOPHILS # BLD: 1 %
BASOPHILS ABSOLUTE: 0.07 K/UL (ref 0–0.2)
BUN BLDV-MCNC: 11 MG/DL (ref 6–20)
BUN/CREAT BLD: ABNORMAL (ref 9–20)
CALCIUM SERPL-MCNC: 9.5 MG/DL (ref 8.6–10.4)
CHLORIDE BLD-SCNC: 103 MMOL/L (ref 98–107)
CO2: 25 MMOL/L (ref 20–31)
CREAT SERPL-MCNC: 0.71 MG/DL (ref 0.5–0.9)
DIFFERENTIAL TYPE: ABNORMAL
EOSINOPHILS RELATIVE PERCENT: 2 %
GFR AFRICAN AMERICAN: >60 ML/MIN
GFR NON-AFRICAN AMERICAN: >60 ML/MIN
GFR SERPL CREATININE-BSD FRML MDRD: ABNORMAL ML/MIN/{1.73_M2}
GFR SERPL CREATININE-BSD FRML MDRD: ABNORMAL ML/MIN/{1.73_M2}
GLUCOSE BLD-MCNC: 85 MG/DL (ref 70–99)
HCT VFR BLD CALC: 40.1 % (ref 36–46)
HEMOGLOBIN: 13.5 G/DL (ref 12–16)
LYMPHOCYTES # BLD: 50 %
MCH RBC QN AUTO: 30.1 PG (ref 26–34)
MCHC RBC AUTO-ENTMCNC: 33.7 G/DL (ref 31–37)
MCV RBC AUTO: 89.5 FL (ref 80–100)
METAMYELOCYTES ABSOLUTE COUNT: 0.07 K/UL
METAMYELOCYTES: 1 %
MONOCYTES # BLD: 3 %
MORPHOLOGY: ABNORMAL
PDW BLD-RTO: 13.9 % (ref 11.5–14.9)
PLATELET # BLD: 188 K/UL (ref 150–450)
PLATELET ESTIMATE: ABNORMAL
PMV BLD AUTO: 8 FL (ref 6–12)
POTASSIUM SERPL-SCNC: 3.2 MMOL/L (ref 3.7–5.3)
RBC # BLD: 4.48 M/UL (ref 4–5.2)
RBC # BLD: ABNORMAL 10*6/UL
SEG NEUTROPHILS: 43 %
SEGMENTED NEUTROPHILS ABSOLUTE COUNT: 2.88 K/UL (ref 1.3–9.1)
SODIUM BLD-SCNC: 140 MMOL/L (ref 135–144)
WBC # BLD: 6.7 K/UL (ref 3.5–11)
WBC # BLD: ABNORMAL 10*3/UL

## 2017-10-03 PROCEDURE — 80048 BASIC METABOLIC PNL TOTAL CA: CPT

## 2017-10-03 PROCEDURE — 85025 COMPLETE CBC W/AUTO DIFF WBC: CPT

## 2017-10-03 PROCEDURE — 99284 EMERGENCY DEPT VISIT MOD MDM: CPT

## 2017-10-03 PROCEDURE — 2580000003 HC RX 258: Performed by: EMERGENCY MEDICINE

## 2017-10-03 PROCEDURE — 96375 TX/PRO/DX INJ NEW DRUG ADDON: CPT

## 2017-10-03 PROCEDURE — 96374 THER/PROPH/DIAG INJ IV PUSH: CPT

## 2017-10-03 PROCEDURE — 6360000002 HC RX W HCPCS: Performed by: EMERGENCY MEDICINE

## 2017-10-03 PROCEDURE — 6370000000 HC RX 637 (ALT 250 FOR IP): Performed by: EMERGENCY MEDICINE

## 2017-10-03 PROCEDURE — 36415 COLL VENOUS BLD VENIPUNCTURE: CPT

## 2017-10-03 RX ORDER — KETOROLAC TROMETHAMINE 30 MG/ML
30 INJECTION, SOLUTION INTRAMUSCULAR; INTRAVENOUS ONCE
Status: COMPLETED | OUTPATIENT
Start: 2017-10-03 | End: 2017-10-03

## 2017-10-03 RX ORDER — DEXAMETHASONE SODIUM PHOSPHATE 4 MG/ML
10 INJECTION, SOLUTION INTRA-ARTICULAR; INTRALESIONAL; INTRAMUSCULAR; INTRAVENOUS; SOFT TISSUE ONCE
Status: COMPLETED | OUTPATIENT
Start: 2017-10-03 | End: 2017-10-03

## 2017-10-03 RX ORDER — DIPHENHYDRAMINE HYDROCHLORIDE 50 MG/ML
25 INJECTION INTRAMUSCULAR; INTRAVENOUS ONCE
Status: COMPLETED | OUTPATIENT
Start: 2017-10-03 | End: 2017-10-03

## 2017-10-03 RX ORDER — POTASSIUM CHLORIDE 20 MEQ/1
40 TABLET, EXTENDED RELEASE ORAL ONCE
Status: COMPLETED | OUTPATIENT
Start: 2017-10-03 | End: 2017-10-03

## 2017-10-03 RX ORDER — NALBUPHINE HCL 10 MG/ML
10 AMPUL (ML) INJECTION ONCE
Status: COMPLETED | OUTPATIENT
Start: 2017-10-03 | End: 2017-10-03

## 2017-10-03 RX ORDER — PROMETHAZINE HYDROCHLORIDE 25 MG/ML
12.5 INJECTION, SOLUTION INTRAMUSCULAR; INTRAVENOUS ONCE
Status: COMPLETED | OUTPATIENT
Start: 2017-10-03 | End: 2017-10-03

## 2017-10-03 RX ORDER — METOCLOPRAMIDE HYDROCHLORIDE 5 MG/ML
10 INJECTION INTRAMUSCULAR; INTRAVENOUS ONCE
Status: DISCONTINUED | OUTPATIENT
Start: 2017-10-03 | End: 2017-10-03

## 2017-10-03 RX ORDER — DIAZEPAM 5 MG/ML
5 INJECTION, SOLUTION INTRAMUSCULAR; INTRAVENOUS ONCE
Status: COMPLETED | OUTPATIENT
Start: 2017-10-03 | End: 2017-10-03

## 2017-10-03 RX ORDER — 0.9 % SODIUM CHLORIDE 0.9 %
1000 INTRAVENOUS SOLUTION INTRAVENOUS ONCE
Status: COMPLETED | OUTPATIENT
Start: 2017-10-03 | End: 2017-10-03

## 2017-10-03 RX ADMIN — KETOROLAC TROMETHAMINE 30 MG: 30 INJECTION, SOLUTION INTRAMUSCULAR at 15:46

## 2017-10-03 RX ADMIN — SODIUM CHLORIDE 1000 ML: 9 INJECTION, SOLUTION INTRAVENOUS at 15:44

## 2017-10-03 RX ADMIN — DIPHENHYDRAMINE HYDROCHLORIDE 25 MG: 50 INJECTION, SOLUTION INTRAMUSCULAR; INTRAVENOUS at 15:47

## 2017-10-03 RX ADMIN — DIAZEPAM 5 MG: 5 INJECTION, SOLUTION INTRAMUSCULAR; INTRAVENOUS at 16:38

## 2017-10-03 RX ADMIN — PROMETHAZINE HYDROCHLORIDE 12.5 MG: 25 INJECTION INTRAMUSCULAR; INTRAVENOUS at 15:49

## 2017-10-03 RX ADMIN — POTASSIUM CHLORIDE 40 MEQ: 20 TABLET, EXTENDED RELEASE ORAL at 16:38

## 2017-10-03 RX ADMIN — NALBUPHINE HYDROCHLORIDE 10 MG: 10 INJECTION, SOLUTION INTRAMUSCULAR; INTRAVENOUS; SUBCUTANEOUS at 18:33

## 2017-10-03 RX ADMIN — DEXAMETHASONE SODIUM PHOSPHATE 10 MG: 4 INJECTION, SOLUTION INTRAMUSCULAR; INTRAVENOUS at 15:44

## 2017-10-03 ASSESSMENT — PAIN SCALES - GENERAL
PAINLEVEL_OUTOF10: 8
PAINLEVEL_OUTOF10: 8
PAINLEVEL_OUTOF10: 9
PAINLEVEL_OUTOF10: 3

## 2017-10-03 NOTE — ED PROVIDER NOTES
resident. I reviewed the residents note and agree with the documented findings and plan of care. Any areas of disagreement are noted on the chart. I was personally present for the key portions of any procedures. I have documented in the chart those procedures where I was not present during the key portions. I have personally reviewed all images and agree with the resident's interpretation. I have reviewed the emergency nurses triage note. I agree with the chief complaint, past medical history, past surgical history, allergies, medications, social and family history as documented unless otherwise noted.     Patricia Gomez DO  Attending Emergency Physician            Patricia Gomez DO  10/03/17 1922

## 2017-10-03 NOTE — ED PROVIDER NOTES
[facility] [address] [facility] ED  Emergency Department Encounter  Emergency Medicine Resident     Pt Name: Bakari Ochoa  MRN: [id number]  [place] 1960  Date of evaluation: 10/3/17  PCP:  Kristopher Peters MD    [address] [place] [address]       Chief Complaint   Patient presents with    Migraine     since 21 of September associated with neck stiffness and nausea       HISTORY OF PRESENT ILLNESS  (Location/Symptom, Timing/Onset, Context/Setting, Quality, Duration, Modifying Factors, Severity.)      Bakari Ochoa is a 61 y.o. female who presents with Headache and nausea since September 21. Patient has a history of migraines and states that this was similar to her typical headache currently going from the right temporal across her forehead, but has been migrating around since the start of a headache. The only new onset symptoms different from her prior headaches that she does have some left-sided neck stiffness. She feels that the muscle is slightly swollen and is mildly tender to the touch. However she does not have any pain sharp shooting sensation when she moves her neck. She states that she has photophobia and phonophobia. Has been having nausea. She has taken her Fioricet and oxygen, home and has not worked. She was in urgent care and they gave her shot of Toradol. She also has not had relief with this. However when she was seen she did state they told her she had an ear infection and they did start her on a Z-Rusty. Patient has been nauseated but has not had any vomiting. Does not have any abdominal pain. Patient has a history of having these migraines and has seen a neurologist in the past, but does not currently follow with anyone. She was to follow-up with pain management to have an epidural for not associated back pain, but had been put on hold because she had taken sulfa and NSAIDs in it. Denies any fevers.     PAST MEDICAL / SURGICAL / SOCIAL / FAMILY HISTORY      has a past medical history of Asthma; COPD tablet daily for the next 4 days 9/30/17 10/5/17  Aundrea Lakhani MD   butalbital-APAP-caffeine -40 MG CAPS per capsule TAKE 1-2 CAPSULE BY MOUTH EVERY FOUR HOURS AS NEEDED 8/23/17   Historical Provider, MD   ondansetron (ZOFRAN-ODT) 4 MG disintegrating tablet DISSOLVE 1 TABLET IN MOUTH EVERY SIX HOURS AS NEEDED 8/23/17   Historical Provider, MD   zolpidem (AMBIEN) 10 MG tablet Take 5 mg by mouth nightly as needed for Sleep    Historical Provider, MD   Naproxen Sodium 220 MG CAPS Take 220 mg by mouth daily as needed for Pain    Historical Provider, MD   aspirin-acetaminophen-caffeine (Johnnie Ales) 248-390-47 MG per tablet Take 1 tablet by mouth daily as needed for Headaches    Historical Provider, MD   trimethobenzamide (TIGAN) 300 MG capsule Take 300 mg by mouth daily as needed    Historical Provider, MD   ibuprofen (ADVIL;MOTRIN) 200 MG tablet Take 200 mg by mouth as needed for Pain    Historical Provider, MD   montelukast (SINGULAIR) 10 MG tablet Take 1 tablet by mouth daily 7/20/17   Farshad Soares MD   glimepiride (AMARYL) 2 MG tablet Take 1 tablet by mouth every morning 7/6/17   Farshad Soares MD   ranitidine (ZANTAC) 150 MG tablet Take 150 mg by mouth 2 times daily    Historical Provider, MD   Blood Glucose Monitoring Suppl (ONE TOUCH ULTRA 2) W/DEVICE KIT USE TO TEST BLOOD SUGAR DAILY AS DIRECTED 4/28/17   Historical Provider, MD   ONE TOUCH ULTRA TEST strip USE TO TEST TWICE DAILY AS DIRECTED 4/28/17   Historical Provider, MD Wall Clutter LANCETS 32L MISC USE TWICE DAILY AS DIRECTED 4/28/17   Historical Provider, MD   aspirin 81 MG tablet Take 81 mg by mouth daily    Historical Provider, MD   albuterol sulfate HFA (VENTOLIN HFA) 108 (90 BASE) MCG/ACT inhaler Inhale 2 puffs into the lungs every 6 hours as needed for Wheezing 4/5/17   Jovani Roy MD       REVIEW OF SYSTEMS    (2-9 systems for level 4, 10 or more for level 5)      Review of Systems   Constitutional: Negative for and dry. Psychiatric: She has a normal mood and affect. Her behavior is normal. Judgment and thought content normal.   Nursing note and vitals reviewed. DIFFERENTIAL  DIAGNOSIS     PLAN (LABS / IMAGING / EKG):  Orders Placed This Encounter   Procedures    CBC Auto Differential    Basic Metabolic Panel       MEDICATIONS ORDERED:  Orders Placed This Encounter   Medications    0.9 % sodium chloride bolus    ketorolac (TORADOL) injection 30 mg    diphenhydrAMINE (BENADRYL) injection 25 mg    DISCONTD: metoclopramide (REGLAN) injection 10 mg    Dexamethasone Sodium Phosphate injection 10 mg    promethazine (PHENERGAN) injection 12.5 mg    diazepam (VALIUM) injection 5 mg    potassium chloride (KLOR-CON M) extended release tablet 40 mEq    nalbuphine (NUBAIN) injection 10 mg       DDX: migraine headache, tension headache, muscle spasm, sinusitis    Initial MDM/Plan: 59-year-old female who comes in with a prolonged headache. The headache is similar in nature to her past headaches except for left-sided neck pain. This headache as being gradual since September 21. It is not the worst headache of her life wishes the longest duration. A low suspicion of any intracranial bleeds or masses. Has had a normal head CT in the past 3 months. Patient does have a history of recent sinusitis which may be she also appears to have a trapezius muscle spasm that is palpable on the left side. There is no midline neck tenderness, and no meningeal signs. Very low suspicion of an exercise process or meningitis at this time. We'll start with a migraine cocktail and then reevaluate.     DIAGNOSTIC RESULTS / EMERGENCY DEPARTMENT COURSE / MDM     LABS:  Labs Reviewed   CBC WITH AUTO DIFFERENTIAL - Abnormal; Notable for the following:        Result Value    Metamyelocytes 1 (*)     Metamyelocytes Absolute 0.07 (*)     All other components within normal limits   BASIC METABOLIC PANEL - Abnormal; Notable for the following: Potassium 3.2 (*)     All other components within normal limits         RADIOLOGY:  none    EKG  none    All EKG's are interpreted by the Emergency Department Physician who either signs or Co-signs this chart in the absence of a cardiologist.    EMERGENCY DEPARTMENT COURSE:  ED Course   Comment By Time   Patient stated that Reglan gives her severe diarrhea. I asked her about Phenergan given that she has other allergies to antibiotics that are similar. She states that she has not had a reaction to this in the past.  Will give Phenergan with Benadryl, Toradol, and normal saline bolus as well as dexamethasone. Will then reevaluate for headache relief. Lb Corado MD 10/03 1523   Patient is still having pain. Given that she doesn't muscle spasm in the back, we'll give her a dose of valium and see if this helps with headache. Will also replace K with 40 mEq oral. Lb Corado MD 10/03 6896   Patient was given one dose of Nubain. Patient had an improvement of pain, now rating a 2 out of ten. He requested to go home at this time. We are comfortable with discharging. Instructed to follow-up with her primary care physician as well as neurology. Instructed to return if any concerning signs or symptoms    Pre-hypertension/Hypertension: The patient has been informed that they may have pre-hypertension or Hypertension based on a blood pressure reading in the emergency department. I recommend that the patient call the primary care provider listed on their discharge instructions or a physician of their choice this week to arrange follow up for further evaluation of possible pre-hypertension or Hypertension. PROCEDURES:  None    CONSULTS:  None    CRITICAL CARE:  Please see attending note    FINAL IMPRESSION      1.  Migraine without aura and with status migrainosus, not intractable          DISPOSITION / PLAN     DISPOSITION Decision to Discharge    PATIENT REFERRED TO:  Heide Cordova MD  [address] [facility] [address]  [phone]    Schedule an appointment as soon as possible for a visit in 1 week      [address] [facility] [address]  [phone]  Go to  If symptoms worsen, As needed      DISCHARGE MEDICATIONS:  Discharge Medication List as of 10/3/2017  7:12 PM          Gunnar Thomas MD  Emergency Medicine Resident    (Please note that portions of this note were completed with a voice recognition program.  Efforts were made to edit the dictations but occasionally words are mis-transcribed.)        Gunnar Thomas MD  Resident  10/04/17 6091

## 2017-10-03 NOTE — ED AVS SNAPSHOT
After Visit Summary  (Discharge Instructions)    Medication List for Home    Based on the information you provided to us as well as any changes during this visit, the following is your updated medication list.  Compare this with your prescription bottles at home. If you have any questions or concerns, contact your primary care physician's office.              Daily Medication List (This medication list can be shared with any Healthcare provider who is helping you manage your medications)      These are medications you told us you were taking at home, CONTINUE taking them after you leave the hospital     albuterol sulfate  (90 Base) MCG/ACT inhaler   Commonly known as:  VENTOLIN HFA   Inhale 2 puffs into the lungs every 6 hours as needed for Wheezing       aspirin 81 MG tablet   Take 81 mg by mouth daily       aspirin-acetaminophen-caffeine 250-250-65 MG per tablet   Commonly known as:  EXCEDRIN MIGRAINE   Take 1 tablet by mouth daily as needed for Headaches       azithromycin 250 MG tablet   Commonly known as:  ZITHROMAX Z-CORBIN   Take 1 tablet by mouth daily for 5 days Take 2 tablets by mouth on the first day and then one tablet daily for the next 4 days       butalbital-APAP-caffeine -40 MG Caps per capsule   TAKE 1-2 CAPSULE BY MOUTH EVERY FOUR HOURS AS NEEDED       glimepiride 2 MG tablet   Commonly known as:  AMARYL   Take 1 tablet by mouth every morning       ibuprofen 200 MG tablet   Commonly known as:  ADVIL;MOTRIN   Take 200 mg by mouth as needed for Pain       montelukast 10 MG tablet   Commonly known as:  SINGULAIR   Take 1 tablet by mouth daily       Naproxen Sodium 220 MG Caps   Take 220 mg by mouth daily as needed for Pain       ondansetron 4 MG disintegrating tablet   Commonly known as:  ZOFRAN-ODT   DISSOLVE 1 TABLET IN MOUTH EVERY SIX HOURS AS NEEDED       ONE TOUCH ULTRA 2 w/Device Kit   USE TO TEST BLOOD SUGAR DAILY AS DIRECTED       ONE TOUCH ULTRA TEST strip aged 48 - 69, and every year for high risk patients per updated national guidelines. However these guidelines can be individualized by your provider. 7/12/2018                 Care Plan Once You Return Home    This section includes instructions you will need to follow once you leave the hospital.  Your care team will discuss these with you, so you and those caring for you know how to best care for your health needs at home. This section may also include educational information about certain health topics that may be of help to you. Important Information if you smoke or are exposed to smoking       SMOKING: QUIT SMOKING. THIS IS THE MOST IMPORTANT ACTION YOU CAN TAKE TO IMPROVE YOUR CURRENT AND FUTURE HEALTH. Call the [address] [place] [address] at [place] NOW ([phone])    Smoking harms nonsmokers. When nonsmokers are around people who smoke, they absorb nicotine, carbon monoxide, and other ingredients of tobacco smoke. DO NOT SMOKE AROUND CHILDREN     Children exposed to secondhand smoke are at an increased risk of:  Sudden Infant Death Syndrome (SIDS), acute respiratory infections, inflammation of the middle ear, and severe asthma. Over a longer time, it causes heart disease and lung cancer. There is no safe level of exposure to secondhand smoke. [employer] Signup     Our records indicate that you have an active [employer] account. You can view your After Visit Summary by going to [email or web address] and logging in with your [employer] username and password. If you don't have a [employer] username and password but a parent or guardian has access to your record, the parent or guardian should login with their own [employer] username and password and access your record to view the After Visit Summary.      Additional Information  If you have questions, please contact the physician practice where you receive care. Remember, [employer] is NOT to be used for urgent needs. For medical emergencies, dial 911. For questions regarding your MyChart account call [phone]. If you have a clinical question, please call your doctor's office. View your information online  ? Review your current list of  medications, immunization, and allergies. ? Review your future test results online . ? Review your discharge instructions provided by your caregivers at discharge    Certain functionality such as prescription refills, scheduling appointments or sending messages to your provider are not activated if your provider does not use [employer] in his/her office    For questions regarding your MyChart account call [phone]. If you have a clinical question, please call your doctor's office. The information on all pages of the After Visit Summary has been reviewed with me, the patient and/or responsible adult, by my health care provider(s). I had the opportunity to ask questions regarding this information. I understand I should dispose of my armband safely at home to protect my health information. A complete copy of the After Visit Summary has been given to me, the patient and/or responsible adult. Patient Signature/Responsible Adult: ___________________________________    Nurse Signature: ___________________________________  Resident/MLP Signature: ___________________________________  Attending Signature: ___________________________________    Date:____________Time:____________              Discharge Instructions            Migraine Headache: Care Instructions  Your Care Instructions  Migraines are painful, throbbing headaches that often start on one side of the head. They may cause nausea and vomiting and make you sensitive to light, sound, or smell. Without treatment, migraines can last from 4 hours to a few days. Medicines can help prevent migraines or stop them after they have started. directed. You may have medicine that you take only when you get a migraine and medicine that you take all the time to help prevent migraines. ¨ If your doctor has prescribed medicine for when you get a headache, take it at the first sign of a migraine, unless your doctor has given you other instructions. ¨ If your doctor has prescribed medicine to prevent migraines, take it exactly as prescribed. Call your doctor if you think you are having a problem with your medicine. · Find healthy ways to deal with stress. Migraines are most common during or right after stressful times. Take time to relax before and after you do something that has caused a migraine in the past.  · Try to keep your muscles relaxed by keeping good posture. Check your jaw, face, neck, and shoulder muscles for tension. Try to relax them. When you sit at a desk, change positions often. And make sure to stretch for 30 seconds each hour. · Get plenty of sleep and exercise. · Eat meals on a regular schedule. Avoid foods and drinks that often trigger migraines. These include chocolate, alcohol (especially red wine and port), aspartame, monosodium glutamate (MSG), and some additives found in foods (such as hot dogs, fields, cold cuts, aged cheeses, and pickled foods). · Limit caffeine. Don't drink too much coffee, tea, or soda. But don't quit caffeine suddenly. That can also give you migraines. · Do not smoke or allow others to smoke around you. If you need help quitting, talk to your doctor about stop-smoking programs and medicines. These can increase your chances of quitting for good. · If you are taking birth control pills or hormone therapy, talk to your doctor about whether they are triggering your migraines. When should you call for help? Call 911 anytime you think you may need emergency care. For example, call if:  · You have signs of a stroke.  These may include:  ¨ Sudden numbness, paralysis, or weakness in your face, arm, or leg,

## 2017-10-04 ASSESSMENT — ENCOUNTER SYMPTOMS
ABDOMINAL PAIN: 0
SINUS PRESSURE: 1
VOMITING: 0
NAUSEA: 1
DIARRHEA: 0
EYE PAIN: 0
TROUBLE SWALLOWING: 0
BACK PAIN: 1
PHOTOPHOBIA: 1
CONSTIPATION: 0
SORE THROAT: 0

## 2017-10-04 NOTE — TELEPHONE ENCOUNTER
Patient came in to get insulin and sliding scale, she stated she took blood sugar right before she left and it was 466, so I called Dr Jeet Carlos to see what she wanted me to do. She told us to take her blood sugar here and it was 437 and then she told us to give patient 10 units of insulin and to have her check it right before dinner and if it was still over 400 then she needs to page Dr Jeet Carlos. Valarie (On license of [facility]) gave patient 10 units of insulin while in the office. Patient was instructed how to use insulin pen and the sliding scale and was given Dr Liss Easton answering center # in case she needs it. Patients  drove her home and she was instructed to go home and relax.

## 2017-10-04 NOTE — TELEPHONE ENCOUNTER
Patient called stating that she was seen in ER last night for migraine and was given an injection of steroids and now her sugars are running over 400. She states she took her Amaryl and it went down to 425. I told patient I would call Dr Nai Machado and see what she wanted to do. I called and spoke with Dr Nai Machado and she told me to give the patient some plain insulin and a sliding scale and for the patient to see her in a couple of days. I let the patient know to come over and get the insulin and sliding scale and we would show her how to use the scale. Patient stated she had to get in the shower and then she would be over.

## 2017-10-06 ENCOUNTER — OFFICE VISIT (OUTPATIENT)
Dept: FAMILY MEDICINE CLINIC | Age: 57
End: 2017-10-06
Payer: MEDICARE

## 2017-10-06 VITALS
OXYGEN SATURATION: 96 % | DIASTOLIC BLOOD PRESSURE: 66 MMHG | BODY MASS INDEX: 34.27 KG/M2 | TEMPERATURE: 97.5 F | HEART RATE: 74 BPM | WEIGHT: 186.2 LBS | SYSTOLIC BLOOD PRESSURE: 132 MMHG | HEIGHT: 62 IN

## 2017-10-06 DIAGNOSIS — K21.9 GASTROESOPHAGEAL REFLUX DISEASE WITHOUT ESOPHAGITIS: ICD-10-CM

## 2017-10-06 DIAGNOSIS — E11.9 TYPE 2 DIABETES MELLITUS WITHOUT COMPLICATION, WITHOUT LONG-TERM CURRENT USE OF INSULIN (HCC): Primary | ICD-10-CM

## 2017-10-06 LAB
GLUCOSE BLD-MCNC: 74 MG/DL
HBA1C MFR BLD: 8.4 %

## 2017-10-06 PROCEDURE — 82962 GLUCOSE BLOOD TEST: CPT | Performed by: FAMILY MEDICINE

## 2017-10-06 PROCEDURE — 83036 HEMOGLOBIN GLYCOSYLATED A1C: CPT | Performed by: FAMILY MEDICINE

## 2017-10-06 PROCEDURE — 99213 OFFICE O/P EST LOW 20 MIN: CPT | Performed by: FAMILY MEDICINE

## 2017-10-06 NOTE — PROGRESS NOTES
Subjective:      Patient ID: Owen Sun is a 61 y.o. female. Chronic Disease Visit Information    BP Readings from Last 3 Encounters:   10/06/17 132/66   10/03/17 (!) 144/65   09/30/17 (!) 155/83          Hemoglobin A1C (%)   Date Value   04/28/2017 7.9     Microalb/Crt. Ratio (mcg/mg creat)   Date Value   05/12/2017 5     LDL Calculated (mg/dL)   Date Value   04/24/2017 102     HDL (mg/dl)   Date Value   04/24/2017 54     BUN (mg/dL)   Date Value   10/03/2017 11     CREATININE (mg/dL)   Date Value   10/03/2017 0.71     Glucose   Date Value   10/06/2017 74 mg/dL   04/24/2017 103 mg/dl (H)            Have you changed or started any medications since your last visit including any over-the-counter medicines, vitamins, or herbal medicines? Yes - Novolog Flexpen  Are you having any side effects from any of your medications? -  no  Have you stopped taking any of your medications? Is so, why? -  yes - Holding on all medications except Novolog, and Amaryl until after next Tuesday due to epidural procedure. Have you seen any other physician or provider since your last visit? Yes - Records Obtained  Have you had any other diagnostic tests since your last visit? Yes - Records Obtained  Have you been seen in the emergency room and/or had an admission to a hospital since we last saw you? Yes - Records Obtained  Have you had your annual diabetic retinal (eye) exam? No  Have you had your routine dental cleaning in the past 6 months? no    Have you activated your [employer] account? If not, what are your barriers?  Yes     Patient Care Team:  Evan Magana MD as PCP - Bean Irvin MD as Consulting Physician (Neurology)  Fred Gomes MD (Rheumatology)  Patricia Romero MD as Consulting Physician (Neurology)  Senia Fay MD as Surgeon (Plastic Surgery)  Donna Jones MD as Consulting Physician (Gastroenterology)         Medical History Review  Past Medical, Family, and Social History reviewed and does

## 2017-10-06 NOTE — MR AVS SNAPSHOT
34.06 kg/m2 Never Smoker          Additional Information about your Body Mass Index (BMI)           Your BMI as listed above is considered obese (30 or more). BMI is an estimate of body fat, calculated from your height and weight. The higher your BMI, the greater your risk of heart disease, high blood pressure, type 2 diabetes, stroke, gallstones, arthritis, sleep apnea, and certain cancers. BMI is not perfect. It may overestimate body fat in athletes and people who are more muscular. Even a small weight loss (between 5 and 10 percent of your current weight) by decreasing your calorie intake and becoming more physically active will help lower your risk of developing or worsening diseases associated with obesity. Learn more at: [email or web address]             Medications and Orders      Your Current Medications Are              insulin aspart (NOVOLOG FLEXPEN) 100 UNIT/ML injection pen FOLLOW SLIDING SCALE COVERAGE AT BREAKFAST LUNCH AND DINNER.     butalbital-APAP-caffeine -40 MG CAPS per capsule TAKE 1-2 CAPSULE BY MOUTH EVERY FOUR HOURS AS NEEDED    ondansetron (ZOFRAN-ODT) 4 MG disintegrating tablet DISSOLVE 1 TABLET IN MOUTH EVERY SIX HOURS AS NEEDED    zolpidem (AMBIEN) 10 MG tablet Take 5 mg by mouth nightly as needed for Sleep    Naproxen Sodium 220 MG CAPS Take 220 mg by mouth daily as needed for Pain    aspirin-acetaminophen-caffeine (EXCEDRIN MIGRAINE) 250-250-65 MG per tablet Take 1 tablet by mouth daily as needed for Headaches    trimethobenzamide (TIGAN) 300 MG capsule Take 300 mg by mouth daily as needed    ibuprofen (ADVIL;MOTRIN) 200 MG tablet Take 800 mg by mouth as needed for Pain     montelukast (SINGULAIR) 10 MG tablet Take 1 tablet by mouth daily    glimepiride (AMARYL) 2 MG tablet Take 1 tablet by mouth every morning    ranitidine (ZANTAC) 150 MG tablet Take 150 mg by mouth 2 times daily Radicular leg pain    Hip pain, right    Lumbar stenosis    Chronic midline low back pain without sciatica    Type 2 diabetes mellitus without complication, without long-term current use of insulin (HCC)    Hepatic steatosis    Pancreas cyst    Gastroesophageal reflux disease without esophagitis    Post menopausal syndrome    Gastroparesis    Carpal tunnel syndrome of right wrist      Immunizations as of 10/6/2017     Name Date    Influenza, Rhonda Stiles, 3 Years and older, IM 10/18/2016      Preventive Care        Date Due    Eye Exam By An Eye Doctor 7/5/1969    Tetanus Combination Vaccine (1 - Tdap) 7/5/1978    Pneumococcal Vaccine - Pneumovax for adults aged 19-64 years with: chronic heart disease, chronic lung disease, diabetes mellitus, alcoholism, chronic liver disease, or cigarette smoking. (1 of 1 - PPSV23) 7/5/1978    Pap Smear 7/5/1980    Yearly Flu Vaccine (1) 9/1/2017    Hepatitis C screening is recommended for all adults regardless of risk factors born between [facility] at least once (lifetime) who have never been tested. 11/3/2017 (Originally 1960)    HIV screening is recommended for all people regardless of risk factors  aged 15-65 years at least once (lifetime) who have never been HIV tested. 11/3/2017 (Originally 7/5/1974)    Cholesterol Screening 4/24/2018    Hemoglobin A1C (Test For Long-Term Glucose Control) 4/28/2018    Urine Check For Kidney Problems 5/12/2018    Diabetic Foot Exam 5/26/2018    Colon Cancer Stool Test 5/26/2018    Mammograms are recommended every 2 years for low/average risk patients aged 48 - 69, and every year for high risk patients per updated national guidelines. However these guidelines can be individualized by your provider. 7/12/2018            Allovue Signup           Allovue allows you to send messages to your doctor, view your test results, renew your prescriptions, schedule appointments, view visit notes, and more. How Do I Sign Up? 1. In your Internet browser, go to [email or web address]  2. Click on the Sign Up Now link in the Sign In box. You will see the New Member Sign Up page. 3. Enter your Zipdial Access Code exactly as it appears below. You will not need to use this code after youve completed the sign-up process. If you do not sign up before the expiration date, you must request a new code. Zipdial Access Code: [email or web address]  Expires: 12/5/2017  3:57 PM    4. Enter your Social Security Number ([id number]) and Date of Birth (mm/dd/yyyy) as indicated and click Submit. You will be taken to the next sign-up page. 5. Create a [employer] ID. This will be your Zipdial login ID and cannot be changed, so think of one that is secure and easy to remember. 6. Create a Zipdial password. You can change your password at any time. 7. Enter your Password Reset Question and Answer. This can be used at a later time if you forget your password. 8. Enter your e-mail address. You will receive e-mail notification when new information is available in [address]. 9. Click Sign Up. You can now view your medical record. Additional Information  If you have questions, please contact the physician practice where you receive care. Remember, Zipdial is NOT to be used for urgent needs. For medical emergencies, dial 911. For questions regarding your Zipdial account call [phone]. If you have a clinical question, please call your doctor's office.

## 2017-10-07 RX ORDER — GLIMEPIRIDE 1 MG/1
1 TABLET ORAL 2 TIMES DAILY
COMMUNITY
End: 2017-10-13

## 2017-10-09 ENCOUNTER — TELEPHONE (OUTPATIENT)
Dept: FAMILY MEDICINE CLINIC | Age: 57
End: 2017-10-09

## 2017-10-09 ASSESSMENT — ENCOUNTER SYMPTOMS
SHORTNESS OF BREATH: 0
NAUSEA: 1
ABDOMINAL PAIN: 0
SORE THROAT: 0
CONSTIPATION: 0

## 2017-10-09 NOTE — TELEPHONE ENCOUNTER
Patient stated that she already spoke with Dr Enriquez Bolus office and they told her to hold off a couple weeks for the injection because her sugars are gonna be very elevated if she does.

## 2017-10-10 ENCOUNTER — HOSPITAL ENCOUNTER (OUTPATIENT)
Dept: PAIN MANAGEMENT | Age: 57
Discharge: HOME | End: 2017-10-10
Payer: MEDICARE

## 2017-10-10 DIAGNOSIS — M54.16 LUMBAR RADICULOPATHY: Primary | ICD-10-CM

## 2017-10-10 DIAGNOSIS — M48.061 DEGENERATIVE LUMBAR SPINAL STENOSIS: ICD-10-CM

## 2017-10-10 DIAGNOSIS — M47.816 ARTHROPATHY OF LUMBAR FACET JOINT: ICD-10-CM

## 2017-10-10 DIAGNOSIS — M47.26 OSTEOARTHRITIS OF SPINE WITH RADICULOPATHY, LUMBAR REGION: ICD-10-CM

## 2017-10-13 ENCOUNTER — OFFICE VISIT (OUTPATIENT)
Dept: FAMILY MEDICINE CLINIC | Age: 57
End: 2017-10-13
Payer: MEDICARE

## 2017-10-13 VITALS
HEIGHT: 62 IN | BODY MASS INDEX: 34.19 KG/M2 | OXYGEN SATURATION: 99 % | WEIGHT: 185.8 LBS | SYSTOLIC BLOOD PRESSURE: 116 MMHG | HEART RATE: 85 BPM | DIASTOLIC BLOOD PRESSURE: 68 MMHG

## 2017-10-13 DIAGNOSIS — E11.9 TYPE 2 DIABETES MELLITUS WITHOUT COMPLICATION, WITHOUT LONG-TERM CURRENT USE OF INSULIN (HCC): Primary | ICD-10-CM

## 2017-10-13 DIAGNOSIS — K21.9 GASTROESOPHAGEAL REFLUX DISEASE WITHOUT ESOPHAGITIS: ICD-10-CM

## 2017-10-13 PROCEDURE — 99213 OFFICE O/P EST LOW 20 MIN: CPT | Performed by: FAMILY MEDICINE

## 2017-10-13 RX ORDER — NATEGLINIDE 60 MG/1
60 TABLET ORAL
Qty: 45 TABLET | Refills: 0 | Status: SHIPPED | OUTPATIENT
Start: 2017-10-13 | End: 2017-10-27 | Stop reason: SDUPTHER

## 2017-10-13 ASSESSMENT — ENCOUNTER SYMPTOMS
SORE THROAT: 0
CONSTIPATION: 0
SHORTNESS OF BREATH: 0
ABDOMINAL PAIN: 0
NAUSEA: 0

## 2017-10-13 NOTE — PROGRESS NOTES
Subjective:      Patient ID: Mykel Gomez is a 61 y.o. female. Chronic Disease Visit Information    BP Readings from Last 3 Encounters:   10/13/17 116/68   10/06/17 132/66   10/03/17 (!) 144/65          Hemoglobin A1C (%)   Date Value   10/06/2017 8.4   04/28/2017 7.9     Microalb/Crt. Ratio (mcg/mg creat)   Date Value   05/12/2017 5     LDL Calculated (mg/dL)   Date Value   04/24/2017 102     HDL (mg/dl)   Date Value   04/24/2017 54     BUN (mg/dL)   Date Value   10/03/2017 11     CREATININE (mg/dL)   Date Value   10/03/2017 0.71     Glucose   Date Value   10/06/2017 74 mg/dL   04/24/2017 103 mg/dl (H)            Have you changed or started any medications since your last visit including any over-the-counter medicines, vitamins, or herbal medicines? no   Are you having any side effects from any of your medications? -  no  Have you stopped taking any of your medications? Is so, why? -  no    Have you seen any other physician or provider since your last visit? No  Have you had any other diagnostic tests since your last visit? No  Have you been seen in the emergency room and/or had an admission to a hospital since we last saw you? No  Have you had your annual diabetic retinal (eye) exam? No  Have you had your routine dental cleaning in the past 6 months? no    Have you activated your [employer] account? If not, what are your barriers?  Yes     Patient Care Team:  Veronica Sifuentes MD as PCP - Ravi Flor MD as Consulting Physician (Neurology)  Mona Coe MD (Rheumatology)  Mi Coulter MD as Consulting Physician (Neurology)  Gildardo Godoy MD as Surgeon (Plastic Surgery)  Jw Evans MD as Consulting Physician (Gastroenterology)         Medical History Review  Past Medical, Family, and Social History reviewed and does contribute to the patient presenting condition    Health Maintenance   Topic Date Due    Diabetic retinal exam  07/05/1969    DTaP/Tdap/Td vaccine (1 - Tdap) 07/05/1978  Pneumococcal med risk (1 of 1 - PPSV23) 07/05/1978    Cervical cancer screen  07/05/1980    Flu vaccine (1) 09/01/2017    Hepatitis C screen  11/03/2017 (Originally 1960)    HIV screen  11/03/2017 (Originally 7/5/1974)    Lipid screen  04/24/2018    Diabetic microalbuminuria test  05/12/2018    Diabetic foot exam  05/26/2018    Colon Cancer Screen FIT/FOBT  05/26/2018    Breast cancer screen  07/12/2018    Diabetic hemoglobin A1C test  10/06/2018     HPI  This 42-year-old female is seen in the office today for follow-up for her diabetes her blood sugars are still running high and they're between 106-289 the patient states when she takes Amaryl few hours later her sugars drop and she becomes very shaky so I told her we'll take her off of it she cannot tolerate metformin and states that [facility] is too expensive for her  postprandials are high so I it told her we'll start her on starlix and see how she does  with that. She needs to eat regular meals which she has not been eating and with starlix if she does not eat she doesn't have to take it  Review of Systems   Constitutional: Negative for appetite change. HENT: Negative for ear pain and sore throat. Eyes: Negative for visual disturbance. Respiratory: Negative for shortness of breath. Cardiovascular: Negative for chest pain. Gastrointestinal: Negative for abdominal pain, constipation and nausea. Genitourinary: Negative for frequency and pelvic pain. Musculoskeletal: Negative for arthralgias. Neurological: Negative for dizziness, syncope and headaches. Objective:   Physical Exam   Constitutional: She is oriented to person, place, and time. She appears well-developed and well-nourished. /68   Pulse 85   Ht 5' 2\" (1.575 m)   Wt 185 lb 12.8 oz (84.3 kg)   SpO2 99%   BMI 33.98 kg/m²    HENT:   Head: Normocephalic.    Mouth/Throat: Oropharynx is clear and moist.        Eyes: Conjunctivae are normal.   Cardiovascular: Normal rate and regular rhythm. Pulmonary/Chest: Breath sounds normal. She has no rales. Abdominal: Soft. Bowel sounds are normal. There is no tenderness. Musculoskeletal: She exhibits no edema. Lymphadenopathy:     She has no cervical adenopathy. Neurological: She is alert and oriented to person, place, and time. Nursing note and vitals reviewed. Assessment:      1. Type 2 diabetes mellitus without complication, without long-term current use of insulin ([facility])  Uncontrolled    2. Gastroesophageal reflux disease without esophagitis  Controlled           Plan:        No orders of the defined types were placed in this encounter. Orders Placed This Encounter   Medications    nateglinide (STARLIX) 60 MG tablet     Sig: Take 1 tablet by mouth 3 times daily (before meals)     Dispense:  45 tablet     Refill:  0     Return in about 2 weeks (around 10/27/2017).     Continue current medications reviewed from the chart  [place] needs to eat regular meals

## 2017-10-27 RX ORDER — NATEGLINIDE 60 MG/1
TABLET ORAL
Qty: 45 TABLET | Refills: 1 | Status: SHIPPED | OUTPATIENT
Start: 2017-10-27 | End: 2017-12-26 | Stop reason: ALTCHOICE

## 2017-11-21 ENCOUNTER — HOSPITAL ENCOUNTER (OUTPATIENT)
Dept: PAIN MANAGEMENT | Age: 57
Discharge: HOME | End: 2017-11-21
Payer: MEDICARE

## 2017-11-21 ENCOUNTER — HOSPITAL ENCOUNTER (OUTPATIENT)
Dept: GENERAL RADIOLOGY | Age: 57
Discharge: HOME | End: 2017-11-21
Payer: MEDICARE

## 2017-11-21 VITALS
WEIGHT: 184 LBS | RESPIRATION RATE: 18 BRPM | DIASTOLIC BLOOD PRESSURE: 70 MMHG | OXYGEN SATURATION: 99 % | TEMPERATURE: 97.5 F | BODY MASS INDEX: 33.86 KG/M2 | HEART RATE: 69 BPM | HEIGHT: 62 IN | SYSTOLIC BLOOD PRESSURE: 133 MMHG

## 2017-11-21 DIAGNOSIS — M54.16 LUMBAR RADICULOPATHY: Primary | ICD-10-CM

## 2017-11-21 DIAGNOSIS — M47.26 OSTEOARTHRITIS OF SPINE WITH RADICULOPATHY, LUMBAR REGION: ICD-10-CM

## 2017-11-21 DIAGNOSIS — R52 PAIN: ICD-10-CM

## 2017-11-21 DIAGNOSIS — M48.061 DEGENERATIVE LUMBAR SPINAL STENOSIS: ICD-10-CM

## 2017-11-21 PROCEDURE — 3209999900 FLUORO FOR SURGICAL PROCEDURES

## 2017-11-21 PROCEDURE — 6360000004 HC RX CONTRAST MEDICATION

## 2017-11-21 PROCEDURE — 62323 NJX INTERLAMINAR LMBR/SAC: CPT | Performed by: PAIN MEDICINE

## 2017-11-21 PROCEDURE — 6360000002 HC RX W HCPCS

## 2017-11-21 PROCEDURE — 6360000002 HC RX W HCPCS: Performed by: PAIN MEDICINE

## 2017-11-21 PROCEDURE — 62327 NJX INTERLAMINAR LMBR/SAC: CPT

## 2017-11-21 RX ORDER — MIDAZOLAM HYDROCHLORIDE 1 MG/ML
INJECTION INTRAMUSCULAR; INTRAVENOUS
Status: COMPLETED | OUTPATIENT
Start: 2017-11-21 | End: 2017-11-21

## 2017-11-21 RX ADMIN — MIDAZOLAM 2 MG: 1 INJECTION INTRAMUSCULAR; INTRAVENOUS at 10:38

## 2017-11-21 ASSESSMENT — PAIN SCALES - GENERAL
PAINLEVEL_OUTOF10: 3
PAINLEVEL_OUTOF10: 3

## 2017-11-21 ASSESSMENT — PAIN - FUNCTIONAL ASSESSMENT: PAIN_FUNCTIONAL_ASSESSMENT: 0-10

## 2017-11-21 NOTE — PROCEDURES
tissues in the left paramedian area were infiltrated with 3 ml of 1% lidocaine. The #20-gauge, 3-1/2 inch Tuohy needle was inserted through the skin wheal and the epidural space was identified using loss of resistance technique with normal saline. This was confirmed with AP and lateral views using fluoroscopy after injecting about 2 ml of Omnipaque-180 and observing the spread of the contrast in the epidural space. Then after negative aspiration a total of 80 mg of triamcinolone with 8 ml of normal saline was injected into the epidural space. The needle is removed and a Band-Aid was placed over the needle insertion site. Patient's vital signs remained stable and the patient tolerated the procedure well. The patient was discharged home in stable condition and will be followed in the pain clinic in the next few weeks for further planning.     Electronically signed by Nneka Spivey MD on 11/21/2017 at 10:53 AM

## 2017-11-21 NOTE — PROGRESS NOTES
Discharge criteria met. Post procedure dressing dry and intact. Sensory and motor function intact as per pre-procedure. Patient alert and oriented x3  Instructions and follow up reviewed with pt at patient at discharge. Discharged home transported by wheelchair, accompanied by family .   Discharged @5545

## 2017-12-12 ENCOUNTER — HOSPITAL ENCOUNTER (OUTPATIENT)
Age: 57
Setting detail: SPECIMEN
Discharge: HOME | End: 2017-12-12
Payer: MEDICARE

## 2017-12-12 ENCOUNTER — TELEPHONE (OUTPATIENT)
Dept: FAMILY MEDICINE CLINIC | Age: 57
End: 2017-12-12

## 2017-12-12 ENCOUNTER — OFFICE VISIT (OUTPATIENT)
Dept: FAMILY MEDICINE CLINIC | Age: 57
End: 2017-12-12
Payer: MEDICARE

## 2017-12-12 ENCOUNTER — HOSPITAL ENCOUNTER (OUTPATIENT)
Dept: ULTRASOUND IMAGING | Age: 57
Discharge: HOME | End: 2017-12-12
Payer: MEDICARE

## 2017-12-12 VITALS
TEMPERATURE: 98.5 F | BODY MASS INDEX: 33.49 KG/M2 | DIASTOLIC BLOOD PRESSURE: 84 MMHG | SYSTOLIC BLOOD PRESSURE: 136 MMHG | OXYGEN SATURATION: 95 % | WEIGHT: 182 LBS | HEART RATE: 63 BPM | HEIGHT: 62 IN

## 2017-12-12 DIAGNOSIS — Z12.4 PAPANICOLAOU SMEAR: ICD-10-CM

## 2017-12-12 DIAGNOSIS — N93.9 VAGINAL BLEEDING: Primary | ICD-10-CM

## 2017-12-12 DIAGNOSIS — N93.9 VAGINAL BLEEDING: ICD-10-CM

## 2017-12-12 DIAGNOSIS — N95.0 POSTMENOPAUSAL BLEEDING: ICD-10-CM

## 2017-12-12 LAB
BILIRUBIN, POC: CLEAR
BLOOD URINE, POC: ABNORMAL
CLARITY, POC: CLEAR
COLOR, POC: YELLOW
CONTROL: NORMAL
GLUCOSE URINE, POC: YELLOW
KETONES, POC: ABNORMAL
LEUKOCYTE EST, POC: ABNORMAL
NITRITE, POC: ABNORMAL
PH, POC: 6
PREGNANCY TEST URINE, POC: NEGATIVE
PROTEIN, POC: ABNORMAL
SPECIFIC GRAVITY, POC: 1.02
UROBILINOGEN, POC: NORMAL

## 2017-12-12 PROCEDURE — 99214 OFFICE O/P EST MOD 30 MIN: CPT | Performed by: FAMILY MEDICINE

## 2017-12-12 PROCEDURE — 81002 URINALYSIS NONAUTO W/O SCOPE: CPT | Performed by: FAMILY MEDICINE

## 2017-12-12 PROCEDURE — 76830 TRANSVAGINAL US NON-OB: CPT

## 2017-12-12 PROCEDURE — 76856 US EXAM PELVIC COMPLETE: CPT

## 2017-12-12 ASSESSMENT — ENCOUNTER SYMPTOMS
TROUBLE SWALLOWING: 0
NAUSEA: 0
BLOOD IN STOOL: 0
SHORTNESS OF BREATH: 0
DIARRHEA: 1
EYE REDNESS: 0
CONSTIPATION: 0
EYE DISCHARGE: 0
ABDOMINAL PAIN: 1
SORE THROAT: 0
CHEST TIGHTNESS: 0
RHINORRHEA: 0
EYE ITCHING: 0
BACK PAIN: 1
COUGH: 0
VOMITING: 0
VOICE CHANGE: 0
WHEEZING: 0
SINUS PRESSURE: 0

## 2017-12-12 NOTE — TELEPHONE ENCOUNTER
Telephone call from ultrasound dept. Official phone report WNL, endometrial stripe WNL. Patient was brought to the phone results given Follow up advised. Patient advised to call for pap results.

## 2017-12-12 NOTE — PROGRESS NOTES
TABLET IN MOUTH EVERY SIX HOURS AS NEEDED  0    Naproxen Sodium 220 MG CAPS Take 220 mg by mouth daily as needed for Pain      aspirin-acetaminophen-caffeine (EXCEDRIN MIGRAINE) 250-250-65 MG per tablet Take 1 tablet by mouth daily as needed for Headaches      trimethobenzamide (TIGAN) 300 MG capsule Take 300 mg by mouth daily as needed      ibuprofen (ADVIL;MOTRIN) 200 MG tablet Take 800 mg by mouth as needed for Pain       ranitidine (ZANTAC) 150 MG tablet Take 150 mg by mouth 2 times daily      Blood Glucose Monitoring Suppl (ONE TOUCH ULTRA 2) W/DEVICE KIT USE TO TEST BLOOD SUGAR DAILY AS DIRECTED  0    ONE TOUCH ULTRA TEST strip USE TO TEST TWICE DAILY AS DIRECTED  1    ONETOUCH DELICA LANCETS 85A MISC USE TWICE DAILY AS DIRECTED  1    albuterol sulfate HFA (VENTOLIN HFA) 108 (90 BASE) MCG/ACT inhaler Inhale 2 puffs into the lungs every 6 hours as needed for Wheezing 1 Inhaler 3    nateglinide (STARLIX) 60 MG tablet TAKE 1 TABLET BY MOUTH THREE TIMES A DAY BEFORE MEALS 45 tablet 1    insulin aspart (NOVOLOG FLEXPEN) 100 UNIT/ML injection pen FOLLOW SLIDING SCALE COVERAGE AT BREAKFAST LUNCH AND DINNER.  1 Pen 0    zolpidem (AMBIEN) 10 MG tablet Take 5 mg by mouth nightly as needed for Sleep      montelukast (SINGULAIR) 10 MG tablet Take 1 tablet by mouth daily 30 tablet 3    aspirin 81 MG tablet Take 81 mg by mouth daily       Current Facility-Administered Medications   Medication Dose Route Frequency Provider Last Rate Last Dose    bupivacaine (MARCAINE) 0.5 % injection 10 mg  2 mL Intradermal Once Geoff Zepeda MD        betamethasone acetate-betamethasone sodium phosphate (CELESTONE) injection 12 mg  12 mg Intramuscular Once Geoff Zepeda MD        lidocaine PF 1 % injection 2 mL  2 mL Intradermal Once Geoff Zepeda MD         Allergies   Allergen Reactions    Compazine [Prochlorperazine Maleate] Anaphylaxis    Stelazine [Trifluoperazine] Anaphylaxis    Thorazine [Chlorpromazine] Anaphylaxis    Amoxicillin Hives and Itching    Augmentin [Amoxicillin-Pot Clavulanate]     Bactrim [Sulfamethoxazole-Trimethoprim]     Ciprofloxacin     Keflex [Cephalexin]     Metformin And Related Diarrhea    Morphine Sulfate [Morphine]     Percocet [Oxycodone-Acetaminophen] Itching    Topamax [Topiramate]     Vicodin [Hydrocodone-Acetaminophen] Itching       Health Maintenance   Topic Date Due    Hepatitis C screen  1960    Diabetic retinal exam  07/05/1969    HIV screen  07/05/1974    DTaP/Tdap/Td vaccine (1 - Tdap) 07/05/1978    Pneumococcal med risk (1 of 1 - PPSV23) 07/05/1978    Cervical cancer screen  07/05/1980    Flu vaccine (1) 09/01/2017    Lipid screen  04/24/2018    Diabetic microalbuminuria test  05/12/2018    Diabetic foot exam  05/26/2018    Colon Cancer Screen FIT/FOBT  05/26/2018    Breast cancer screen  07/12/2018    Diabetic hemoglobin A1C test  10/06/2018       Subjective:      Review of Systems   Constitutional: Positive for diaphoresis and fatigue. Negative for activity change, appetite change, chills and fever. HENT: Negative for congestion, ear discharge, ear pain, hearing loss, postnasal drip, rhinorrhea, sinus pressure, sneezing, sore throat, trouble swallowing and voice change. Eyes: Negative for discharge, redness, itching and visual disturbance. Dry eyes lately. Respiratory: Negative for cough, chest tightness, shortness of breath and wheezing. Cardiovascular: Negative for chest pain. Gastrointestinal: Positive for abdominal pain and diarrhea. Negative for blood in stool, constipation, nausea and vomiting. Genitourinary: Positive for dyspareunia, flank pain, pelvic pain and vaginal bleeding. Negative for decreased urine volume, difficulty urinating, dysuria, frequency, hematuria, urgency, vaginal discharge and vaginal pain. Musculoskeletal: Positive for back pain. Negative for joint pain, neck pain and neck stiffness.    Skin: is warm. No rash noted. She is not diaphoretic. Psychiatric: She has a normal mood and affect. Her behavior is normal. Judgment and thought content normal.   Vitals reviewed. /84 (Site: Left Arm, Position: Sitting, Cuff Size: Medium Adult)   Pulse 63   Temp 98.5 °F (36.9 °C) (Oral)   Ht 5' 2\" (1.575 m)   Wt 182 lb (82.6 kg)   SpO2 95%   BMI 33.29 kg/m²     Assessment:      1. Vaginal bleeding  POCT Urinalysis no Micro    POCT urine pregnancy    US PELVIS COMPLETE    Human Papillomavirus (HPV) DNA Probe Thin Prep High Risk   2. Papanicolaou smear         Plan:      Patient has an appointment to see gyn Dr Glen Murillo 12/29/17. Patient advised to call for test results  She is advised to keep track of her bleeding. .    Orders Placed This Encounter   Procedures    US PELVIS COMPLETE     Standing Status:   Future     Standing Expiration Date:   12/12/2018     Order Specific Question:   Reason for exam:     Answer:   post menopausal bleeding    Human Papillomavirus (HPV) DNA Probe Thin Prep High Risk     Order Specific Question:   Specify Date & Time of Incident     Answer:   Postmenopausal bleeding    POCT Urinalysis no Micro    POCT urine pregnancy     No orders of the defined types were placed in this encounter. Patient given educational materials - see patient instructions. Discussed use, benefit, and side effects of prescribed medications. All patient questions answered. Pt voiced understanding. Reviewed health maintenance. Instructed to continue current medications, diet and exercise. Patient agreed with treatment plan. Follow up as directed.      Electronically signed by Wenceslao Purcell MD on 12/12/2017 at 12:05 PM

## 2017-12-12 NOTE — PATIENT INSTRUCTIONS
Patient Education        Female Reproductive Organs, Front View: Anatomy Sketch    Current as of: October 13, 2016  Content Version: 11.4  © 2006-2017 [employer]. Care instructions adapted under license by [facility] [employer]What [facility] ([facility]). If you have questions about a medical condition or this instruction, always ask your healthcare professional. [address] any warranty or liability for your use of this information. Patient Education        Pap Test: Care Instructions  Your Care Instructions    The Pap test (also called a Pap smear) is a screening test for cancer of the cervix, which is the lower part of the uterus that opens into the vagina. The test can help your doctor find early changes in the cells that could lead to cancer. The sample of cells taken during your test has been sent to a lab so that an expert can look at the cells. It usually takes a week or two to get the results back. Follow-up care is a key part of your treatment and safety. Be sure to make and go to all appointments, and call your doctor if you are having problems. It's also a good idea to know your test results and keep a list of the medicines you take. What do the results mean? · A normal result means that the test did not find any abnormal cells in the sample. · An abnormal result can mean many things. Most of these are not cancer. The results of your test may be abnormal because:  ¨ You have an infection of the vagina or cervix, such as a yeast infection. ¨ You have an IUD (intrauterine device for birth control). ¨ You have low estrogen levels after menopause that are causing the cells to change. ¨ You have cell changes that may be a sign of precancer or cancer. The results are ranked based on how serious the changes might be. There are many other reasons why you might not get a normal result. If the results were abnormal, you may need to get another test within a few weeks or months.  If the results of your test may be abnormal because:  ¨ You have an infection of the vagina or cervix, such as a yeast infection. ¨ You have an IUD (intrauterine device for birth control). ¨ You have low estrogen levels after menopause that are causing the cells to change. ¨ You have cell changes that may be a sign of precancer or cancer. The results are ranked based on how serious the changes might be. Where can you learn more? Go to [email or web address] and sign in to your [employer] account. Enter P919 in the Thinktwice box to learn more about \"Learning About Pap Tests. \"     If you do not have an account, please click on the \"Sign Up Now\" link. Current as of: May 12, 2017  Content Version: 11.4  © 5576-8814 [employer]. Care instructions adapted under license by [facility] ([facility]). If you have questions about a medical condition or this instruction, always ask your healthcare professional. [place] [address] any warranty or liability for your use of this information. Patient Education        Vaginal Bleeding in Nonpregnant Women: Care Instructions  Your Care Instructions    Many women have bleeding or spotting between periods. Lots of things can cause it. You may bleed because of hormone problems, stress, or ovulation. Fibroids and IUDs (intrauterine devices) can also cause bleeding. If your bleeding or spotting is caused by one of these things and is not heavy or doesn't happen often, you probably don't need to worry. But in rare cases, infection, cancer, or other serious conditions can cause bleeding. So you may need more tests to find the cause of your bleeding. The doctor has checked you carefully, but problems can develop later. If you notice any problems or new symptoms, get medical treatment right away. Follow-up care is a key part of your treatment and safety. Be sure to make and go to all appointments, and call your doctor if you are having problems. It's also a good idea to know your test results and keep a list of the medicines you take. How can you care for yourself at home? · Take pain medicines exactly as directed. ¨ If the doctor gave you a prescription medicine for pain, take it as prescribed. ¨ If you are not taking a prescription pain medicine, ask your doctor if you can take an over-the-counter medicine. Do not take aspirin, which may make bleeding worse. · If your doctor prescribed birth control pills for your bleeding, take them as directed. · Eat foods that are high in iron and vitamin C. Foods high in iron include red meat, shellfish, eggs, beans, and leafy green vegetables. Foods high in vitamin C include citrus fruits, tomatoes, and broccoli. Ask your doctor if you need to take iron pills or a multivitamin. · Ask your doctor when it is okay to have sex. When should you call for help? Call 911 anytime you think you may need emergency care. For example, call if:  ? · You passed out (lost consciousness). ?Call your doctor now or seek immediate medical care if:  ? · You have severe vaginal bleeding. ? · You are dizzy or lightheaded, or you feel like you may faint. ? · You have new or worse belly or pelvic pain. ? Watch closely for changes in your health, and be sure to contact your doctor if:  ? · Your bleeding gets worse. ? · You think you might be pregnant. ? · You do not get better as expected. Where can you learn more? Go to [email or web address]://Sinapis [email or web address].[email or web address] and sign in to your [employer]gen account. Enter E860 in the [facility] box to learn more about \"Vaginal Bleeding in Nonpregnant Women: Care Instructions. \"     If you do not have an account, please click on the \"Sign Up Now\" link. Current as of: October 13, 2016  Content Version: 11.4  © 7093-3748 Healthwise[employer]. Care instructions adapted under license by [facility] ([facility]).  If you have questions about a medical condition or this instruction, always

## 2017-12-14 ENCOUNTER — TELEPHONE (OUTPATIENT)
Dept: FAMILY MEDICINE CLINIC | Age: 57
End: 2017-12-14

## 2017-12-14 NOTE — TELEPHONE ENCOUNTER
Telephone call received from patient. Patient calling to check in to help Pap test results. The Pap test was done 2 days ago. Patient was advised that we do not have results back yet ,but, that she is free to call us on a daily basis  to get results. Patient is on her way on a vacation to [place] for the Christmas holidays with her family.

## 2017-12-15 LAB
HPV SAMPLE: NORMAL
HPV SOURCE: NORMAL
HPV, GENOTYPE 16: NOT DETECTED
HPV, GENOTYPE 18: NOT DETECTED
HPV, HIGH RISK OTHER: NOT DETECTED
HPV, INTERPRETATION: NORMAL

## 2017-12-19 ENCOUNTER — TELEPHONE (OUTPATIENT)
Dept: FAMILY MEDICINE CLINIC | Age: 57
End: 2017-12-19

## 2017-12-21 LAB — CYTOLOGY REPORT: NORMAL

## 2017-12-26 ENCOUNTER — OFFICE VISIT (OUTPATIENT)
Dept: FAMILY MEDICINE CLINIC | Age: 57
End: 2017-12-26
Payer: MEDICARE

## 2017-12-26 ENCOUNTER — TELEPHONE (OUTPATIENT)
Dept: FAMILY MEDICINE CLINIC | Age: 57
End: 2017-12-26

## 2017-12-26 VITALS
OXYGEN SATURATION: 98 % | BODY MASS INDEX: 32.07 KG/M2 | HEIGHT: 63 IN | SYSTOLIC BLOOD PRESSURE: 137 MMHG | DIASTOLIC BLOOD PRESSURE: 65 MMHG | WEIGHT: 181 LBS | RESPIRATION RATE: 16 BRPM | TEMPERATURE: 97.9 F | HEART RATE: 61 BPM

## 2017-12-26 DIAGNOSIS — J01.90 ACUTE SINUSITIS, RECURRENCE NOT SPECIFIED, UNSPECIFIED LOCATION: Primary | ICD-10-CM

## 2017-12-26 DIAGNOSIS — J20.9 ACUTE BRONCHITIS, UNSPECIFIED ORGANISM: ICD-10-CM

## 2017-12-26 PROCEDURE — 99213 OFFICE O/P EST LOW 20 MIN: CPT | Performed by: FAMILY MEDICINE

## 2017-12-26 RX ORDER — AZITHROMYCIN 250 MG/1
250 TABLET, FILM COATED ORAL DAILY
Qty: 6 TABLET | Refills: 0 | Status: SHIPPED | OUTPATIENT
Start: 2017-12-26 | End: 2017-12-31

## 2017-12-26 RX ORDER — GLIMEPIRIDE 2 MG/1
TABLET ORAL
COMMUNITY
Start: 2017-12-08 | End: 2018-02-12

## 2017-12-26 ASSESSMENT — ENCOUNTER SYMPTOMS
BACK PAIN: 0
EYE ITCHING: 0
SHORTNESS OF BREATH: 1
EYE DISCHARGE: 0
SINUS PAIN: 1
COUGH: 1
NAUSEA: 0
SORE THROAT: 1
SINUS PRESSURE: 1
WHEEZING: 0
CHEST TIGHTNESS: 0
VOICE CHANGE: 0
RHINORRHEA: 1
EYE REDNESS: 0
CONSTIPATION: 0
DIARRHEA: 0
HEMOPTYSIS: 1
VOMITING: 0
ABDOMINAL PAIN: 0
TROUBLE SWALLOWING: 0

## 2017-12-26 NOTE — PATIENT INSTRUCTIONS
Sinusitis: Care Instructions  Your Care Instructions    Sinusitis is an infection of the lining of the sinus cavities in your head. Sinusitis often follows a cold. It causes pain and pressure in your head and face. In most cases, sinusitis gets better on its own in 1 to 2 weeks. But some mild symptoms may last for several weeks. Sometimes antibiotics are needed. Follow-up care is a key part of your treatment and safety. Be sure to make and go to all appointments, and call your doctor if you are having problems. It's also a good idea to know your test results and keep a list of the medicines you take. How can you care for yourself at home? · Take an over-the-counter pain medicine, such as acetaminophen (Tylenol), ibuprofen (Advil, Motrin), or naproxen (Aleve). Read and follow all instructions on the label. · If the doctor prescribed antibiotics, take them as directed. Do not stop taking them just because you feel better. You need to take the full course of antibiotics. · Be careful when taking over-the-counter cold or flu medicines and Tylenol at the same time. Many of these medicines have acetaminophen, which is Tylenol. Read the labels to make sure that you are not taking more than the recommended dose. Too much acetaminophen (Tylenol) can be harmful. · Breathe warm, moist air from a steamy shower, a hot bath, or a sink filled with hot water. Avoid cold, dry air. Using a humidifier in your home may help. Follow the directions for cleaning the machine. · Use saline (saltwater) nasal washes to help keep your nasal passages open and wash out mucus and bacteria. You can buy saline nose drops at a grocery store or drugstore. Or you can make your own at home by adding 1 teaspoon of salt and 1 teaspoon of baking soda to 2 cups of distilled water. If you make your own, fill a bulb syringe with the solution, insert the tip into your nostril, and squeeze gently. Celio Ebbs your nose.   · Put a hot, wet towel or a warm gel

## 2017-12-26 NOTE — PROGRESS NOTES
 CHOLECYSTECTOMY      COLONOSCOPY  2010    EYE SURGERY      wandering eye x3    LAPAROSCOPY      SHOULDER SURGERY Right     ligament/tear       Family History   Problem Relation Age of Onset    Other Mother      emphysema    Stroke Mother     COPD Mother     Heart Disease Father     Cancer Sister      ovarian    Stroke Brother     Other Sister      lung issues    Other Maternal Uncle      hyperglycmeia       Social History   Substance Use Topics    Smoking status: Never Smoker    Smokeless tobacco: Never Used    Alcohol use No      Current Outpatient Prescriptions   Medication Sig Dispense Refill    glimepiride (AMARYL) 2 MG tablet       azithromycin (ZITHROMAX Z-CORBIN) 250 MG tablet Take 1 tablet by mouth daily for 5 days Take 2 tablets by mouth on the first day and then one tablet daily for the next 4 days 6 tablet 0    insulin aspart (NOVOLOG FLEXPEN) 100 UNIT/ML injection pen FOLLOW SLIDING SCALE COVERAGE AT BREAKFAST LUNCH AND DINNER.  1 Pen 0    ondansetron (ZOFRAN-ODT) 4 MG disintegrating tablet DISSOLVE 1 TABLET IN MOUTH EVERY SIX HOURS AS NEEDED  0    zolpidem (AMBIEN) 10 MG tablet Take 5 mg by mouth nightly as needed for Sleep      Naproxen Sodium 220 MG CAPS Take 220 mg by mouth daily as needed for Pain      aspirin-acetaminophen-caffeine (EXCEDRIN MIGRAINE) 250-250-65 MG per tablet Take 1 tablet by mouth daily as needed for Headaches      trimethobenzamide (TIGAN) 300 MG capsule Take 300 mg by mouth daily as needed      ibuprofen (ADVIL;MOTRIN) 200 MG tablet Take 800 mg by mouth as needed for Pain       montelukast (SINGULAIR) 10 MG tablet Take 1 tablet by mouth daily 30 tablet 3    ranitidine (ZANTAC) 150 MG tablet Take 150 mg by mouth 2 times daily      Blood Glucose Monitoring Suppl (ONE TOUCH ULTRA 2) W/DEVICE KIT USE TO TEST BLOOD SUGAR DAILY AS DIRECTED  0    ONE TOUCH ULTRA TEST strip USE TO TEST TWICE DAILY AS DIRECTED  1    ONETOUCH DELICA LANCETS 91A MISC USE TWICE DAILY AS DIRECTED  1    aspirin 81 MG tablet Take 81 mg by mouth daily      albuterol sulfate HFA (VENTOLIN HFA) 108 (90 BASE) MCG/ACT inhaler Inhale 2 puffs into the lungs every 6 hours as needed for Wheezing 1 Inhaler 3     Current Facility-Administered Medications   Medication Dose Route Frequency Provider Last Rate Last Dose    bupivacaine (MARCAINE) 0.5 % injection 10 mg  2 mL Intradermal Once Monett MD Bart        betamethasone acetate-betamethasone sodium phosphate (CELESTONE) injection 12 mg  12 mg Intramuscular Once Monett MD Bart        lidocaine PF 1 % injection 2 mL  2 mL Intradermal Once Monett MD Bart         Allergies   Allergen Reactions    Compazine [Prochlorperazine Maleate] Anaphylaxis    Stelazine [Trifluoperazine] Anaphylaxis    Thorazine [Chlorpromazine] Anaphylaxis    Amoxicillin Hives and Itching    Augmentin [Amoxicillin-Pot Clavulanate]     Bactrim [Sulfamethoxazole-Trimethoprim]     Ciprofloxacin     Keflex [Cephalexin]     Metformin And Related Diarrhea    Morphine Sulfate [Morphine]     Percocet [Oxycodone-Acetaminophen] Itching    Topamax [Topiramate]     Vicodin [Hydrocodone-Acetaminophen] Itching       Health Maintenance   Topic Date Due    Hepatitis C screen  1960    Diabetic retinal exam  07/05/1969    HIV screen  07/05/1974    DTaP/Tdap/Td vaccine (1 - Tdap) 07/05/1978    Pneumococcal med risk (1 of 1 - PPSV23) 07/05/1978    Flu vaccine (1) 09/01/2017    Lipid screen  04/24/2018    Diabetic microalbuminuria test  05/12/2018    Diabetic foot exam  05/26/2018    Colon Cancer Screen FIT/FOBT  05/26/2018    Breast cancer screen  07/12/2018    Diabetic hemoglobin A1C test  10/06/2018    Cervical cancer screen  12/12/2022       Subjective:      Review of Systems   Constitutional: Positive for appetite change, chills and fatigue. Negative for activity change, diaphoresis and fever.    HENT: Positive for congestion, postnasal drip, rhinorrhea, sinus pain, sinus pressure and sore throat. Negative for ear discharge, ear pain, hearing loss, sneezing, trouble swallowing and voice change. Eyes: Negative for discharge, redness, itching and visual disturbance. Respiratory: Positive for cough, hemoptysis and shortness of breath. Negative for chest tightness and wheezing. Cardiovascular: Negative for chest pain, palpitations and leg swelling. Gastrointestinal: Negative for abdominal pain, constipation, diarrhea, nausea and vomiting. Genitourinary: Positive for vaginal bleeding. Negative for decreased urine volume, dysuria, flank pain and frequency. Appointment with gynecologist 12/29/17   Musculoskeletal: Negative for back pain, neck pain and neck stiffness. Skin: Negative for rash. Allergic/Immunologic: Negative for environmental allergies. Neurological: Positive for light-headedness and headaches. Negative for dizziness and weakness. Hematological: Negative for adenopathy. Psychiatric/Behavioral: The patient is not nervous/anxious. Objective:     Physical Exam   Constitutional: She is oriented to person, place, and time. She appears well-developed and well-nourished. No distress. HENT:   Head: Normocephalic. Right Ear: External ear normal. A middle ear effusion is present. Left Ear: External ear normal. A middle ear effusion is present. Nose: Mucosal edema, rhinorrhea and sinus tenderness present. Right sinus exhibits maxillary sinus tenderness and frontal sinus tenderness. Left sinus exhibits maxillary sinus tenderness and frontal sinus tenderness. Mouth/Throat: Mucous membranes are normal. Posterior oropharyngeal erythema present. No oropharyngeal exudate or posterior oropharyngeal edema. Eyes: Conjunctivae and EOM are normal. Pupils are equal, round, and reactive to light. Right eye exhibits no discharge. Left eye exhibits no discharge. Neck: Normal range of motion. Neck supple.    Cardiovascular: Normal rate, 10:31 AM

## 2018-01-29 ENCOUNTER — TELEPHONE (OUTPATIENT)
Dept: PAIN MANAGEMENT | Age: 58
End: 2018-01-29

## 2018-02-12 ENCOUNTER — HOSPITAL ENCOUNTER (OUTPATIENT)
Dept: PAIN MANAGEMENT | Age: 58
Discharge: HOME | End: 2018-02-12
Payer: MEDICARE

## 2018-02-12 VITALS
HEART RATE: 90 BPM | DIASTOLIC BLOOD PRESSURE: 67 MMHG | SYSTOLIC BLOOD PRESSURE: 108 MMHG | RESPIRATION RATE: 17 BRPM | OXYGEN SATURATION: 96 % | BODY MASS INDEX: 33.49 KG/M2 | TEMPERATURE: 97.8 F | WEIGHT: 182 LBS | HEIGHT: 62 IN

## 2018-02-12 DIAGNOSIS — M48.061 DEGENERATIVE LUMBAR SPINAL STENOSIS: ICD-10-CM

## 2018-02-12 DIAGNOSIS — M48.061 SPINAL STENOSIS OF LUMBAR REGION, UNSPECIFIED WHETHER NEUROGENIC CLAUDICATION PRESENT: ICD-10-CM

## 2018-02-12 DIAGNOSIS — M54.16 LUMBAR RADICULOPATHY: Primary | ICD-10-CM

## 2018-02-12 DIAGNOSIS — M47.816 ARTHROPATHY OF LUMBAR FACET JOINT: ICD-10-CM

## 2018-02-12 DIAGNOSIS — M47.26 OSTEOARTHRITIS OF SPINE WITH RADICULOPATHY, LUMBAR REGION: ICD-10-CM

## 2018-02-12 DIAGNOSIS — M25.551 HIP PAIN, RIGHT: ICD-10-CM

## 2018-02-12 PROCEDURE — 99214 OFFICE O/P EST MOD 30 MIN: CPT | Performed by: PAIN MEDICINE

## 2018-02-12 PROCEDURE — 99213 OFFICE O/P EST LOW 20 MIN: CPT

## 2018-02-12 ASSESSMENT — ENCOUNTER SYMPTOMS
PHOTOPHOBIA: 0
CONSTIPATION: 0
BLURRED VISION: 0
EYES NEGATIVE: 1
SORE THROAT: 0
SHORTNESS OF BREATH: 0
VOMITING: 0
COUGH: 0
BACK PAIN: 1
HEARTBURN: 0
NAUSEA: 0
RESPIRATORY NEGATIVE: 1

## 2018-02-12 ASSESSMENT — PAIN DESCRIPTION - ORIENTATION: ORIENTATION: LEFT;RIGHT;LOWER

## 2018-02-12 ASSESSMENT — PAIN DESCRIPTION - FREQUENCY: FREQUENCY: CONTINUOUS

## 2018-02-12 ASSESSMENT — PAIN DESCRIPTION - ONSET: ONSET: ON-GOING

## 2018-02-12 ASSESSMENT — PAIN DESCRIPTION - LOCATION: LOCATION: BACK;LEG

## 2018-02-12 ASSESSMENT — PAIN DESCRIPTION - DIRECTION: RADIATING_TOWARDS: RIGHT LEG WORSE THAN LEFT LEG

## 2018-02-12 ASSESSMENT — PAIN DESCRIPTION - PROGRESSION: CLINICAL_PROGRESSION: GRADUALLY WORSENING

## 2018-02-12 ASSESSMENT — PAIN DESCRIPTION - PAIN TYPE: TYPE: CHRONIC PAIN

## 2018-02-12 ASSESSMENT — PAIN SCALES - GENERAL: PAINLEVEL_OUTOF10: 7

## 2018-02-12 NOTE — PROGRESS NOTES
include headaches and tingling. Pertinent negatives include no chest pain, dysuria, fever or weight loss. Risk factors include obesity and lack of exercise. OARRS compliant? not applicable  Concern for prescription abuse?not applicable    Current Pain Assessment  Pain Assessment  Pain Assessment: 0-10  Pain Level: 7  Pain Type: Chronic pain  Pain Location: Back, Leg  Pain Orientation: Left, Right, Lower  Pain Radiating Towards: Right leg worse than left leg  Pain Descriptors: Constant, Aching, Burning, Heaviness, Sharp, Tingling, Dull, Tiring  Pain Frequency: Continuous  Pain Onset: On-going  Clinical Progression: Gradually worsening  Effect of Pain on Daily Activities: More difficult to do household work & walking  Patient's Stated Pain Goal:  (To decrease pain & increase activiity)  Pain Intervention(s): Medication (see eMar), Rest, Repositioned, Heat applied, Elevation                    ADVERSE MEDICATION EFFECTS:   Constipation: no  Bowel Regimen: Yes  Diet: common adult  Appetite:  ok  Sedation:  not applicable  Urinary Retention: no    FOCUSED PAIN SCALE:  Highest : 9  Lowest :5  Average: Range-7  When and What  was your last procedure:    11/21/17 LESI L4-5  Was your procedure effective:  Yes, 100% 1/1/2 months    ACTIVITY/SOCIAL/EMOTIONAL:  Sleep Pattern: 6 hours per night. nightime awakenings  Energy Level:  Tired/Fatigued  Currently attending Physical Therapy:  No  Home Exercises: daily housecleaning  Mobility: walking increases the pain  Currently seeing a Psychiatrist or Psychologist:  No  Emotional Issues: anxiety/ nervousness   Mood: depressed , PTSD    ABERRANT BEHAVIORS SINCE LAST VISIT:  Have you ever been treated in another Pain Clinic yes, Dr. David Young for prescriptions appropriate: yes  Lost rx/pills: no  Taking more medication than prescribed:  no  Are you receiving PAIN medications from  other doctors: no  Last Urine/Serum Drug Screen :9/24/17  Was Serum/UDS as anticipated?   Yes, TWICE DAILY AS DIRECTED  1    aspirin 81 MG tablet Take 81 mg by mouth daily      albuterol sulfate HFA (VENTOLIN HFA) 108 (90 BASE) MCG/ACT inhaler Inhale 2 puffs into the lungs every 6 hours as needed for Wheezing 1 Inhaler 3    zolpidem (AMBIEN) 10 MG tablet Take 5 mg by mouth nightly as needed for Sleep      Blood Glucose Monitoring Suppl (ONE TOUCH ULTRA 2) W/DEVICE KIT USE TO TEST BLOOD SUGAR DAILY AS DIRECTED  0    ONETOUCH DELICA LANCETS 91S MISC USE TWICE DAILY AS DIRECTED  1     Current Facility-Administered Medications   Medication Dose Route Frequency Provider Last Rate Last Dose    bupivacaine (MARCAINE) 0.5 % injection 10 mg  2 mL Intradermal Once Shazia Mcgee MD        betamethasone acetate-betamethasone sodium phosphate (CELESTONE) injection 12 mg  12 mg Intramuscular Once Shazia Mcgee MD        lidocaine PF 1 % injection 2 mL  2 mL Intradermal Once Shazia Mcgee MD           Allergies  Compazine [prochlorperazine maleate]; Stelazine [trifluoperazine]; Thorazine [chlorpromazine]; Amoxicillin; Augmentin [amoxicillin-pot clavulanate]; Bactrim [sulfamethoxazole-trimethoprim]; Ciprofloxacin; Keflex [cephalexin]; Metformin and related; Morphine sulfate [morphine]; Percocet [oxycodone-acetaminophen]; Topamax [topiramate]; and Vicodin [hydrocodone-acetaminophen]    Family History  family history includes COPD in her mother; Cancer in her sister; Heart Disease in her father; Other in her maternal uncle, mother, and sister; Stroke in her brother and mother.     Social History  Social History     Social History    Marital status:      Spouse name: N/A    Number of children: N/A    Years of education: N/A     Social History Main Topics    Smoking status: Never Smoker    Smokeless tobacco: Never Used    Alcohol use No    Drug use: No    Sexual activity: Yes     Partners: Male     Other Topics Concern    None     Social History Narrative    None      reports that she does not use drugs.        REVIEW OF SYSTEMS:  Review of Systems   Constitutional: Negative. Negative for chills, fever, malaise/fatigue and weight loss. HENT: Negative. Negative for congestion, hearing loss and sore throat. Eyes: Negative. Negative for blurred vision and photophobia. Respiratory: Negative. Negative for cough and shortness of breath. Cardiovascular: Negative for chest pain, palpitations and leg swelling. Gastrointestinal: Negative for constipation, heartburn, nausea and vomiting. Genitourinary: Negative. Negative for dysuria and hematuria. Musculoskeletal: Positive for back pain, joint pain, myalgias and neck pain. Negative for falls. Skin: Negative. Negative for itching and rash. Neurological: Positive for tingling and headaches. Negative for dizziness, tremors and seizures. Endo/Heme/Allergies: Negative. Does not bruise/bleed easily. Psychiatric/Behavioral: Positive for depression. Negative for hallucinations, substance abuse and suicidal ideas. The patient is nervous/anxious and has insomnia. History of PTSD            GENERAL PHYSICAL EXAM:  Vitals: /67   Pulse 90   Temp 97.8 °F (36.6 °C) (Oral)   Resp 17   Ht 5' 2\" (1.575 m)   Wt 182 lb (82.6 kg)   SpO2 96%   BMI 33.29 kg/m² , Body mass index is 33.29 kg/m². Physical Exam   Constitutional: She is oriented to person, place, and time. She appears well-developed and well-nourished. HENT:   Head: Normocephalic and atraumatic. Eyes: Conjunctivae and EOM are normal. Pupils are equal, round, and reactive to light. Neck: Neck supple. No tracheal deviation present. No thyromegaly present. Cardiovascular: Normal rate and regular rhythm. Pulmonary/Chest: No respiratory distress. Abdominal: She exhibits no distension. Musculoskeletal: She exhibits no edema. Neurological: She is alert and oriented to person, place, and time. She has normal strength.  She displays no atrophy, no tremor and normal Counselling/Preventive measures for pain  Control:    [x]  Spine strengthening exercises are discussed with patient in detail. . Patient is counseled on importance of exercise and,core strengthening. Some  specific exercises to strengthen the abdominal muscles and low back muscles Were discussed. Also aquatic (water) physical therapy and benefits were explained to patient. including \" Water supports the body and minimizes the effect of gravity, making it easier for patients to start an exercise program.\"   The following important principles were discussed with patient:  1. Limit Bed Rest--Studies show that people with short-term low-back pain who rest feel more pain and have a harder time with daily tasks than those who stay active. 2. Keep Exercising-patient is advised to stay away from strenuous activities like gardening and avoid whatever motion caused the pain in the first place.   3. Maintain Good Posture-Exercises  to maintain good posture were shown to patient. 4. To do exercises learned in PT regularly. [x]Information (handout) on exercise was  given to patient. Decision Making Process : Patient's health history and referral records thoroughly reviewed before focused physical examination and discussion with patient. Over 50% of today's visit is spent on examining the patient and counseling. Level of complexity of date to be reviewed is Moderate. The chart date reviewed include the following: Imaging Reports. Summary of Care. Time spent reviewing with patient the below reports:   Medication safety, Treatment options. Level of diagnosis and management options of this case is multiple: involving the following management options: Interventions as needed, medication management as appropriate, future visits, activity modification, heat/ice as needed, Urine drug screen as required. [x]The patient's questions were answered to the best of my abilities.   This note was created using voice recognition software. There may be inaccuracies of transcription  that are inadvertently overlooked prior to the signature. There is any questions about the transcription please contact me.        Electronically signed by Sadaf Valencia MD on 2/12/2018 at 1:27 PM

## 2018-02-16 ENCOUNTER — TELEPHONE (OUTPATIENT)
Dept: PAIN MANAGEMENT | Age: 58
End: 2018-02-16

## 2018-02-23 ENCOUNTER — HOSPITAL ENCOUNTER (OUTPATIENT)
Dept: PAIN MANAGEMENT | Age: 58
Discharge: HOME | End: 2018-02-23
Payer: MEDICARE

## 2018-02-23 ENCOUNTER — HOSPITAL ENCOUNTER (OUTPATIENT)
Dept: GENERAL RADIOLOGY | Age: 58
Discharge: HOME | End: 2018-02-25
Payer: MEDICARE

## 2018-02-23 VITALS
WEIGHT: 184 LBS | TEMPERATURE: 97.7 F | HEART RATE: 77 BPM | DIASTOLIC BLOOD PRESSURE: 76 MMHG | OXYGEN SATURATION: 99 % | HEIGHT: 62 IN | SYSTOLIC BLOOD PRESSURE: 130 MMHG | BODY MASS INDEX: 33.86 KG/M2 | RESPIRATION RATE: 16 BRPM

## 2018-02-23 DIAGNOSIS — M47.26 OSTEOARTHRITIS OF SPINE WITH RADICULOPATHY, LUMBAR REGION: ICD-10-CM

## 2018-02-23 DIAGNOSIS — M54.16 LUMBAR RADICULOPATHY: Primary | ICD-10-CM

## 2018-02-23 DIAGNOSIS — M48.061 DEGENERATIVE LUMBAR SPINAL STENOSIS: ICD-10-CM

## 2018-02-23 DIAGNOSIS — M54.16 LUMBAR RADICULOPATHY: ICD-10-CM

## 2018-02-23 DIAGNOSIS — M47.816 ARTHROPATHY OF LUMBAR FACET JOINT: ICD-10-CM

## 2018-02-23 DIAGNOSIS — M48.061 SPINAL STENOSIS OF LUMBAR REGION, UNSPECIFIED WHETHER NEUROGENIC CLAUDICATION PRESENT: ICD-10-CM

## 2018-02-23 PROCEDURE — 62327 NJX INTERLAMINAR LMBR/SAC: CPT

## 2018-02-23 PROCEDURE — 3209999900 FLUORO FOR SURGICAL PROCEDURES

## 2018-02-23 PROCEDURE — 6360000002 HC RX W HCPCS: Performed by: PAIN MEDICINE

## 2018-02-23 PROCEDURE — 62323 NJX INTERLAMINAR LMBR/SAC: CPT | Performed by: PAIN MEDICINE

## 2018-02-23 PROCEDURE — 6360000002 HC RX W HCPCS

## 2018-02-23 PROCEDURE — 6360000004 HC RX CONTRAST MEDICATION

## 2018-02-23 RX ORDER — MIDAZOLAM HYDROCHLORIDE 1 MG/ML
INJECTION INTRAMUSCULAR; INTRAVENOUS
Status: COMPLETED | OUTPATIENT
Start: 2018-02-23 | End: 2018-02-23

## 2018-02-23 RX ADMIN — MIDAZOLAM 2 MG: 1 INJECTION INTRAMUSCULAR; INTRAVENOUS at 08:04

## 2018-02-23 ASSESSMENT — PAIN SCALES - GENERAL: PAINLEVEL_OUTOF10: 4

## 2018-02-23 ASSESSMENT — PAIN - FUNCTIONAL ASSESSMENT: PAIN_FUNCTIONAL_ASSESSMENT: 0-10

## 2018-02-23 NOTE — PROCEDURES
2. Degenerative lumbar spinal stenosis    3. Osteoarthritis of spine with radiculopathy, lumbar region    4. Arthropathy of lumbar facet joint        Postoperative Diagnosis:    1. Lumbar radiculopathy    2. Degenerative lumbar spinal stenosis    3. Osteoarthritis of spine with radiculopathy, lumbar region    4. Arthropathy of lumbar facet joint        Procedure Performed:  Lumbar epidural steroid injection under fluoroscopy guidance with IV sedation. Indication for the Procedure: The patient failed conservative management  for pain in the low back radiating to lower extremities. As the patient is not responding to conservative management and it is interfering with activities of daily living we decided to proceed with lumbar epidural steroid injection. The procedure and risks were discussed with the patient and an informed consent was obtained  Current Pain Assessment  Pain Assessment  Pain Assessment: 0-10  Pain Level: 4  Pain Type: Chronic pain  Pain Location: Back, Leg  Pain Orientation: Right, Left  Pain Radiating Towards: right leg worse than left  Pain Descriptors: Constant, Aching, Burning, Heaviness, Sharp, Tingling, Dull, Tiring  Pain Frequency: Continuous  Pain Onset: On-going  Clinical Progression: Gradually worsening  Effect of Pain on Daily Activities: More difficult to do household work & walking  Patient's Stated Pain Goal: 2 (To decrease pain & increase activiity)  Pain Intervention(s): Medication (see eMar), Rest, Repositioned  Response to Pain Intervention:  (11/21/ LESI L4-5 100% for 1 12 months)     Procedure:    After starting an IV, the patient was sedated with 2 mg of Midazolam and 0 mcg of Fentanyl Intravenously by the RN under my direct supervision. Patient's vital signs including BP, EKG and SaO2 were monitored by the RN and they remained stable during the procedure. A meaningful communication was kept up with the patient throughout  the procedure.     The patient is placed in

## 2018-02-26 ENCOUNTER — TELEPHONE (OUTPATIENT)
Dept: PAIN MANAGEMENT | Age: 58
End: 2018-02-26

## 2018-03-02 ENCOUNTER — OFFICE VISIT (OUTPATIENT)
Dept: FAMILY MEDICINE CLINIC | Age: 58
End: 2018-03-02
Payer: MEDICARE

## 2018-03-02 VITALS
RESPIRATION RATE: 16 BRPM | TEMPERATURE: 97.6 F | BODY MASS INDEX: 29.99 KG/M2 | DIASTOLIC BLOOD PRESSURE: 80 MMHG | WEIGHT: 180 LBS | HEIGHT: 65 IN | SYSTOLIC BLOOD PRESSURE: 143 MMHG | OXYGEN SATURATION: 98 % | HEART RATE: 83 BPM

## 2018-03-02 DIAGNOSIS — J02.9 ACUTE VIRAL PHARYNGITIS: Primary | ICD-10-CM

## 2018-03-02 DIAGNOSIS — R05.9 COUGH: ICD-10-CM

## 2018-03-02 LAB — S PYO AG THROAT QL: NORMAL

## 2018-03-02 PROCEDURE — 87880 STREP A ASSAY W/OPTIC: CPT | Performed by: FAMILY MEDICINE

## 2018-03-02 PROCEDURE — 99214 OFFICE O/P EST MOD 30 MIN: CPT | Performed by: FAMILY MEDICINE

## 2018-03-02 RX ORDER — BROMPHENIRAMINE MALEATE, PSEUDOEPHEDRINE HYDROCHLORIDE, AND DEXTROMETHORPHAN HYDROBROMIDE 2; 30; 10 MG/5ML; MG/5ML; MG/5ML
5 SYRUP ORAL 4 TIMES DAILY PRN
Qty: 180 ML | Refills: 0 | Status: SHIPPED | OUTPATIENT
Start: 2018-03-02

## 2018-03-02 RX ORDER — FLUTICASONE PROPIONATE 50 MCG
1 SPRAY, SUSPENSION (ML) NASAL 2 TIMES DAILY
Qty: 1 BOTTLE | Refills: 2 | Status: SHIPPED | OUTPATIENT
Start: 2018-03-02 | End: 2018-03-16

## 2018-03-02 RX ORDER — AZITHROMYCIN 250 MG/1
TABLET, FILM COATED ORAL
Qty: 6 TABLET | Refills: 0 | Status: SHIPPED | OUTPATIENT
Start: 2018-03-02 | End: 2022-06-07

## 2018-03-02 ASSESSMENT — ENCOUNTER SYMPTOMS
RHINORRHEA: 1
SINUS PAIN: 1
GASTROINTESTINAL NEGATIVE: 1
EYES NEGATIVE: 1
ALLERGIC/IMMUNOLOGIC NEGATIVE: 1
SORE THROAT: 1
COUGH: 1
WHEEZING: 1

## 2019-07-31 ENCOUNTER — TELEPHONE (OUTPATIENT)
Dept: FAMILY MEDICINE CLINIC | Age: 59
End: 2019-07-31

## 2019-10-23 ENCOUNTER — TELEPHONE (OUTPATIENT)
Dept: FAMILY MEDICINE CLINIC | Age: 59
End: 2019-10-23

## 2021-01-08 ENCOUNTER — HOSPITAL ENCOUNTER (EMERGENCY)
Age: 61
Discharge: HOME | End: 2021-01-08
Attending: EMERGENCY MEDICINE
Payer: COMMERCIAL

## 2021-01-08 VITALS
HEART RATE: 70 BPM | BODY MASS INDEX: 31.1 KG/M2 | RESPIRATION RATE: 16 BRPM | DIASTOLIC BLOOD PRESSURE: 61 MMHG | WEIGHT: 169 LBS | SYSTOLIC BLOOD PRESSURE: 146 MMHG | TEMPERATURE: 97.9 F | HEIGHT: 62 IN | OXYGEN SATURATION: 97 %

## 2021-01-08 DIAGNOSIS — T78.40XA ALLERGIC REACTION, INITIAL ENCOUNTER: Primary | ICD-10-CM

## 2021-01-08 PROCEDURE — 2500000003 HC RX 250 WO HCPCS: Performed by: NURSE PRACTITIONER

## 2021-01-08 PROCEDURE — 96374 THER/PROPH/DIAG INJ IV PUSH: CPT

## 2021-01-08 PROCEDURE — 99283 EMERGENCY DEPT VISIT LOW MDM: CPT

## 2021-01-08 PROCEDURE — 96375 TX/PRO/DX INJ NEW DRUG ADDON: CPT

## 2021-01-08 PROCEDURE — 6360000002 HC RX W HCPCS: Performed by: NURSE PRACTITIONER

## 2021-01-08 RX ORDER — DIPHENHYDRAMINE HYDROCHLORIDE 50 MG/ML
25 INJECTION INTRAMUSCULAR; INTRAVENOUS ONCE
Status: COMPLETED | OUTPATIENT
Start: 2021-01-08 | End: 2021-01-08

## 2021-01-08 RX ORDER — PREDNISONE 20 MG/1
40 TABLET ORAL DAILY
Qty: 10 TABLET | Refills: 0 | Status: SHIPPED | OUTPATIENT
Start: 2021-01-08 | End: 2021-01-13

## 2021-01-08 RX ORDER — DEXAMETHASONE SODIUM PHOSPHATE 10 MG/ML
10 INJECTION, SOLUTION INTRAMUSCULAR; INTRAVENOUS ONCE
Status: COMPLETED | OUTPATIENT
Start: 2021-01-08 | End: 2021-01-08

## 2021-01-08 RX ADMIN — DIPHENHYDRAMINE HYDROCHLORIDE 25 MG: 50 INJECTION, SOLUTION INTRAMUSCULAR; INTRAVENOUS at 13:25

## 2021-01-08 RX ADMIN — DEXAMETHASONE SODIUM PHOSPHATE 10 MG: 10 INJECTION, SOLUTION INTRAMUSCULAR; INTRAVENOUS at 13:24

## 2021-01-08 RX ADMIN — FAMOTIDINE 20 MG: 10 INJECTION, SOLUTION INTRAVENOUS at 13:24

## 2021-01-08 NOTE — ED PROVIDER NOTES
EMERGENCY DEPARTMENT ENCOUNTER   ATTENDING ATTESTATION     Pt Name: Oral Love  MRN: [id number]  [place] 1960  Date of evaluation: 1/8/21   Oral Love is a 63 y.o. female with CC: Oral Swelling (pt states her tongue is swollen and feels like she is having a hard time breathing. )    MDM:   Patient is a 26-year-old female with history of COPD, stroke, diabetes here with facial redness swelling and tongue swelling. She states that she noted it this morning. She noted some itching to her face and redness to her upper cheeks and forehead as well as some swelling below her eyes. She states her tongue felt swollen when she felt a little bit short of breath. She states she is no longer short of breath. She denies being on any blood pressure medications specifically no ACE or ARB. She is recently started Trulicity has had two doses last dose was Sunday 5 days ago. Otherwise denies any new exposures no new foods antibiotics or travel. Denies any stroke symptoms no blurry double vision no focal weakness numbness tingling. Denies chest pain abdominal pain nausea. On exam no distress speaking full sentences 97% on room air posterior oropharynx clear no erythema no swelling uvula midline no pooling of secretions no tongue elevation no lip swelling the face does appear erythematous and flushed there is no hives neck is supple no meningismus speaking in full sentences tongue does not appear swollen there is no stridor no wheezing no stroke deficits. Impression is allergic reaction, at this point there is no signs of angioedema. Will medicate, treat symptomatically for allergic reaction  and reassess ensure that she is tolerating p.o. intake. If she remains the same or improves can have her follow-up regarding Trulicity. Strict return precautions given if she is discharged.          CRITICAL CARE: EKG: All EKG's are interpreted by the Emergency Department Physician who either signs or Co-signs this chart in the absence of a cardiologist.      RADIOLOGY:All plain film, CT, MRI, and formal ultrasound images (except ED bedside ultrasound) are read by the radiologist, see reports below, unless otherwise noted in MDM or here. No orders to display     LABS: All lab results were reviewed by myself, and all abnormals are listed below. Labs Reviewed - No data to display  CONSULTS:  None  FINAL IMPRESSION    No diagnosis found.         PASTMEDICAL HISTORY     Past Medical History:   Diagnosis Date    Asthma     COPD (chronic obstructive pulmonary disease) (HCC)     CTS (carpal tunnel syndrome)     Endometrial disorder     Fibromyalgia     Gastroesophageal reflux disease without esophagitis 10/18/2016    Gastroparesis     Hepatic steatosis     Hyperglycemia     Inguinal hernia     Migraine     Neurocardiogenic syncope     Pancreas cyst     Pituitary adenoma ([facility] [address] 11/14/2016    PTSD (post-traumatic stress disorder)     RAD (reactive airway disease)     Stroke [facility])      SURGICAL HISTORY       Past Surgical History:   Procedure Laterality Date    BUNIONECTOMY Bilateral     CARPAL TUNNEL RELEASE Right 08/22/2016    per Dr. Wang Folds Left 09/12/2016    [facility]     CERVICAL SPINE SURGERY      x2    CHOLECYSTECTOMY      COLONOSCOPY  2010    DILATION AND CURETTAGE OF UTERUS  02/21/2018    EYE SURGERY      wandering eye x3    LAPAROSCOPY      SHOULDER SURGERY Right     ligament/tear     CURRENT MEDICATIONS       Previous Medications    ALBUTEROL SULFATE HFA (VENTOLIN HFA) 108 (90 BASE) MCG/ACT INHALER    Inhale 2 puffs into the lungs every 6 hours as needed for Wheezing    ASPIRIN 81 MG TABLET    Take 81 mg by mouth daily    ASPIRIN-ACETAMINOPHEN-CAFFEINE (EXCEDRIN MIGRAINE) 250-250-65 MG PER TABLET    Take 1 tablet by mouth daily as needed for Headaches AZITHROMYCIN (ZITHROMAX) 250 MG TABLET    Please take as z-pack. BLOOD GLUCOSE MONITORING SUPPL (ONE TOUCH ULTRA 2) W/DEVICE KIT    USE TO TEST BLOOD SUGAR DAILY AS DIRECTED    BROMPHENIRAMINE-PSEUDOEPHEDRINE-DM 30-2-10 MG/5ML SYRUP    Take 5 mLs by mouth 4 times daily as needed for Cough    FLUTICASONE (FLONASE) 50 MCG/ACT NASAL SPRAY    1 spray by Nasal route 2 times daily for 14 days    IBUPROFEN (ADVIL;MOTRIN) 200 MG TABLET    Take 800 mg by mouth as needed for Pain     METFORMIN (GLUCOPHAGE) 500 MG TABLET    Take 500 mg by mouth 2 times daily (with meals)    NAPROXEN SODIUM 220 MG CAPS    Take 220 mg by mouth daily as needed for Pain    ONDANSETRON (ZOFRAN-ODT) 4 MG DISINTEGRATING TABLET    DISSOLVE 1 TABLET IN MOUTH EVERY SIX HOURS AS NEEDED    ONE TOUCH ULTRA TEST STRIP    USE TO TEST TWICE DAILY AS DIRECTED    ONETOUCH DELICA LANCETS 51V MISC    USE TWICE DAILY AS DIRECTED    RANITIDINE (ZANTAC) 150 MG TABLET    Take 150 mg by mouth 2 times daily    TRIMETHOBENZAMIDE (TIGAN) 300 MG CAPSULE    Take 300 mg by mouth daily as needed    ZOLPIDEM (AMBIEN) 10 MG TABLET    Take 5 mg by mouth nightly as needed for Sleep     ALLERGIES     is allergic to compazine [prochlorperazine maleate]; stelazine [trifluoperazine]; thorazine [chlorpromazine]; amoxicillin; augmentin [amoxicillin-pot clavulanate]; bactrim [sulfamethoxazole-trimethoprim]; ciprofloxacin; keflex [cephalexin]; morphine sulfate [morphine]; percocet [oxycodone-acetaminophen]; topamax [topiramate]; and vicodin [hydrocodone-acetaminophen].   FAMILY HISTORY She indicated that her mother is . She indicated that her father is alive. She indicated that both of her sisters are alive. She indicated that her brother is alive. She indicated that her maternal grandmother is . She indicated that her maternal grandfather is . She indicated that her paternal grandmother is . She indicated that her paternal grandfather is . She indicated that her maternal uncle is alive. She indicated that her paternal uncle is alive. SOCIAL HISTORY       Social History     Tobacco Use    Smoking status: Never Smoker    Smokeless tobacco: Never Used   Substance Use Topics    Alcohol use: No    Drug use: No       I personally evaluated and examined the patient in conjunction with the APC and agree with the assessment, treatment plan, and disposition of the patient as recorded by the APC.    Abbey Costello MD  Attending Emergency Physician         Abbey Costello MD  21 1430       Abbey Costello MD  21 1432       Abbey Costello MD  21 1434

## 2021-01-09 ASSESSMENT — ENCOUNTER SYMPTOMS
SHORTNESS OF BREATH: 0
FACIAL SWELLING: 1
TROUBLE SWALLOWING: 1
COUGH: 0
COLOR CHANGE: 1
NAUSEA: 0
VOMITING: 0

## 2021-01-09 NOTE — ED PROVIDER NOTES
[address] [place] [address] ED  EMERGENCY DEPARTMENT ENCOUNTER      Pt Name: Luis Daniel Costello  MRN: [id number]  [place] 1960  Date of evaluation: 1/9/21  CHIEF COMPLAINT       Chief Complaint   Patient presents with    Oral Swelling     pt states her tongue is swollen and feels like she is having a hard time breathing. HISTORY OF PRESENT ILLNESS   Presents to the emergency room via private auto with concern for allergic reaction. Symptom started last night with redness to the face. When she woke up this morning she had swelling to the face and feels as if her tongue is swollen. Food and fluids are going down but she feels as if she is having difficulty swallowing. The only new medication she has had is her Trulicity which she started 12 days ago. Takes that medication once a week. She has had 2 doses. Most recent dose was 5 days ago. She has multiple medication allergies and is certain that is what she is currently experiencing. She has not had any changes to soaps, lotions or detergents. She has not eaten any new foods. The history is provided by the patient. REVIEW OF SYSTEMS     Review of Systems   Constitutional: Negative for activity change and fever. HENT: Positive for facial swelling and trouble swallowing. Negative for congestion. Respiratory: Negative for cough and shortness of breath. Cardiovascular: Negative for chest pain and palpitations. Gastrointestinal: Negative for nausea and vomiting. Musculoskeletal: Negative for arthralgias and myalgias. Skin: Positive for color change and rash. Neurological: Negative for dizziness and headaches. Psychiatric/Behavioral: Negative for confusion and decreased concentration.      PASTMEDICAL HISTORY     Past Medical History:   Diagnosis Date    Asthma     COPD (chronic obstructive pulmonary disease) (HCC)     CTS (carpal tunnel syndrome)     Endometrial disorder     Fibromyalgia trimethobenzamide (TIGAN) 300 MG capsule Take 300 mg by mouth daily as neededHistorical Med      ibuprofen (ADVIL;MOTRIN) 200 MG tablet Take 800 mg by mouth as needed for Pain Historical Med      ranitidine (ZANTAC) 150 MG tablet Take 150 mg by mouth 2 times dailyHistorical Med      Blood Glucose Monitoring Suppl (ONE TOUCH ULTRA 2) W/DEVICE KIT R-0, Historical Med      ONE TOUCH ULTRA TEST strip USE TO TEST TWICE DAILY AS DIRECTED, R-1, DAWHistorical Med      ONETOUCH DELICA LANCETS 61W MISC USE TWICE DAILY AS DIRECTED, R-1Historical Med      aspirin 81 MG tablet Take 81 mg by mouth dailyHistorical Med      albuterol sulfate HFA (VENTOLIN HFA) 108 (90 BASE) MCG/ACT inhaler Inhale 2 puffs into the lungs every 6 hours as needed for Wheezing, Disp-1 Inhaler, R-3Normal           ALLERGIES     is allergic to compazine [prochlorperazine maleate]; stelazine [trifluoperazine]; thorazine [chlorpromazine]; amoxicillin; augmentin [amoxicillin-pot clavulanate]; bactrim [sulfamethoxazole-trimethoprim]; ciprofloxacin; keflex [cephalexin]; morphine sulfate [morphine]; percocet [oxycodone-acetaminophen]; topamax [topiramate]; and vicodin [hydrocodone-acetaminophen]. FAMILY HISTORY     She indicated that her mother is . She indicated that her father is alive. She indicated that both of her sisters are alive. She indicated that her brother is alive. She indicated that her maternal grandmother is . She indicated that her maternal grandfather is . She indicated that her paternal grandmother is . She indicated that her paternal grandfather is . She indicated that her maternal uncle is alive. She indicated that her paternal uncle is alive.      SOCIAL HISTORY       Social History     Tobacco Use    Smoking status: Never Smoker    Smokeless tobacco: Never Used   Substance Use Topics    Alcohol use: No    Drug use: No     PHYSICAL EXAM INITIAL VITALS: BP (!) 146/61   Pulse 70   Temp 97.9 °F (36.6 °C) (Oral)   Resp 16   Ht 5' 2\" (1.575 m)   Wt 169 lb (76.7 kg)   SpO2 97%   BMI 30.91 kg/m²    Physical Exam  Constitutional:       Appearance: Normal appearance. HENT:      Head:      Comments: Mild swelling to the face. Mild nonraised slightly erythematous macules to the face. No hives. Right Ear: External ear normal.      Left Ear: External ear normal.      Mouth/Throat:      Mouth: Mucous membranes are moist.      Comments: No appreciable swelling to the tongue, however, she does have a slight lisp which the patient states is not normal for her. No swelling to the soft palate or uvula. No drooling. Managing secretions  Eyes:      Extraocular Movements: Extraocular movements intact. Conjunctiva/sclera: Conjunctivae normal.      Pupils: Pupils are equal, round, and reactive to light. Cardiovascular:      Rate and Rhythm: Normal rate and regular rhythm. Pulmonary:      Effort: Pulmonary effort is normal.      Breath sounds: Normal breath sounds. Musculoskeletal: Normal range of motion. Right lower leg: No edema. Left lower leg: No edema. Skin:     General: Skin is warm and dry. Neurological:      General: No focal deficit present. Mental Status: She is alert and oriented to person, place, and time. Cranial Nerves: No cranial nerve deficit. Coordination: Coordination normal.   Psychiatric:         Mood and Affect: Mood normal.         Thought Content: Thought content normal.         DIAGNOSTIC RESULTS     RADIOLOGY:All plain film, CT, MRI, and formal ultrasound images (except ED bedside ultrasound) are read by the radiologist, see reports below, unless otherwise noted in MDM or here.     Interpretation per the Radiologist below, if available at the time of this note:    No orders to display       LABS:  Labs Reviewed - No data to display

## 2022-05-31 ENCOUNTER — HOSPITAL ENCOUNTER (OUTPATIENT)
Dept: PREADMISSION TESTING | Age: 62
Discharge: HOME | End: 2022-05-31

## 2022-06-07 ENCOUNTER — HOSPITAL ENCOUNTER (OUTPATIENT)
Dept: PREADMISSION TESTING | Age: 62
Discharge: HOME | End: 2022-06-11
Payer: COMMERCIAL

## 2022-06-07 VITALS
HEART RATE: 60 BPM | HEIGHT: 62 IN | WEIGHT: 150 LBS | DIASTOLIC BLOOD PRESSURE: 58 MMHG | OXYGEN SATURATION: 98 % | RESPIRATION RATE: 18 BRPM | TEMPERATURE: 97.8 F | BODY MASS INDEX: 27.6 KG/M2 | SYSTOLIC BLOOD PRESSURE: 113 MMHG

## 2022-06-07 LAB
ABSOLUTE EOS #: 0.14 K/UL (ref 0–0.44)
ABSOLUTE IMMATURE GRANULOCYTE: 0 K/UL (ref 0–0.3)
ABSOLUTE LYMPH #: 2.12 K/UL (ref 1.1–3.7)
ABSOLUTE MONO #: 0.36 K/UL (ref 0.1–1.2)
ANION GAP SERPL CALCULATED.3IONS-SCNC: 8 MMOL/L (ref 9–17)
BASOPHILS # BLD: 1 % (ref 0–2)
BASOPHILS ABSOLUTE: 0.05 K/UL (ref 0–0.2)
BILIRUBIN URINE: NEGATIVE
BUN BLDV-MCNC: 16 MG/DL (ref 8–23)
BUN/CREAT BLD: 21 (ref 9–20)
CALCIUM SERPL-MCNC: 9.6 MG/DL (ref 8.6–10.4)
CHLORIDE BLD-SCNC: 106 MMOL/L (ref 98–107)
CO2: 27 MMOL/L (ref 20–31)
COLOR: YELLOW
COMMENT UA: NORMAL
CREAT SERPL-MCNC: 0.77 MG/DL (ref 0.5–0.9)
EKG ATRIAL RATE: 62 BPM
EKG P AXIS: 39 DEGREES
EKG P-R INTERVAL: 172 MS
EKG Q-T INTERVAL: 452 MS
EKG QRS DURATION: 82 MS
EKG QTC CALCULATION (BAZETT): 458 MS
EKG R AXIS: 42 DEGREES
EKG T AXIS: 55 DEGREES
EKG VENTRICULAR RATE: 62 BPM
EOSINOPHILS RELATIVE PERCENT: 3 % (ref 1–4)
ESTIMATED AVERAGE GLUCOSE: 146 MG/DL
GFR AFRICAN AMERICAN: >60 ML/MIN
GFR NON-AFRICAN AMERICAN: >60 ML/MIN
GFR SERPL CREATININE-BSD FRML MDRD: ABNORMAL ML/MIN/{1.73_M2}
GLUCOSE BLD-MCNC: 117 MG/DL (ref 70–99)
GLUCOSE URINE: NEGATIVE
HBA1C MFR BLD: 6.7 % (ref 4–6)
HCT VFR BLD CALC: 38.8 % (ref 36.3–47.1)
HEMOGLOBIN: 12.3 G/DL (ref 11.9–15.1)
IMMATURE GRANULOCYTES: 0 %
INR BLD: 1
KETONES, URINE: NEGATIVE
LEUKOCYTE ESTERASE, URINE: NEGATIVE
LYMPHOCYTES # BLD: 46 % (ref 24–43)
MCH RBC QN AUTO: 29.3 PG (ref 25.2–33.5)
MCHC RBC AUTO-ENTMCNC: 31.7 G/DL (ref 28.4–34.8)
MCV RBC AUTO: 92.4 FL (ref 82.6–102.9)
MONOCYTES # BLD: 8 % (ref 3–12)
NITRITE, URINE: NEGATIVE
NRBC AUTOMATED: 0 PER 100 WBC
PARTIAL THROMBOPLASTIN TIME: 33.9 SEC (ref 23.9–33.8)
PDW BLD-RTO: 13.2 % (ref 11.8–14.4)
PH UA: 5.5 (ref 5–8)
PLATELET # BLD: 159 K/UL (ref 138–453)
PMV BLD AUTO: 9.6 FL (ref 8.1–13.5)
POTASSIUM SERPL-SCNC: 3.8 MMOL/L (ref 3.7–5.3)
PROTEIN UA: NEGATIVE
PROTHROMBIN TIME: 13.2 SEC (ref 11.5–14.2)
RBC # BLD: 4.2 M/UL (ref 3.95–5.11)
SEG NEUTROPHILS: 42 % (ref 36–65)
SEGMENTED NEUTROPHILS ABSOLUTE COUNT: 1.95 K/UL (ref 1.5–8.1)
SODIUM BLD-SCNC: 141 MMOL/L (ref 135–144)
SPECIFIC GRAVITY UA: 1.02 (ref 1–1.03)
TURBIDITY: CLEAR
URINE HGB: NEGATIVE
UROBILINOGEN, URINE: NORMAL
WBC # BLD: 4.6 K/UL (ref 3.5–11.3)

## 2022-06-07 PROCEDURE — 93010 ELECTROCARDIOGRAM REPORT: CPT | Performed by: INTERNAL MEDICINE

## 2022-06-07 PROCEDURE — 85025 COMPLETE CBC W/AUTO DIFF WBC: CPT

## 2022-06-07 PROCEDURE — 85610 PROTHROMBIN TIME: CPT

## 2022-06-07 PROCEDURE — 83036 HEMOGLOBIN GLYCOSYLATED A1C: CPT

## 2022-06-07 PROCEDURE — 80048 BASIC METABOLIC PNL TOTAL CA: CPT

## 2022-06-07 PROCEDURE — 85730 THROMBOPLASTIN TIME PARTIAL: CPT

## 2022-06-07 PROCEDURE — 87641 MR-STAPH DNA AMP PROBE: CPT

## 2022-06-07 PROCEDURE — 81003 URINALYSIS AUTO W/O SCOPE: CPT

## 2022-06-07 PROCEDURE — 87086 URINE CULTURE/COLONY COUNT: CPT

## 2022-06-07 PROCEDURE — 93005 ELECTROCARDIOGRAM TRACING: CPT | Performed by: ORTHOPAEDIC SURGERY

## 2022-06-07 PROCEDURE — 36415 COLL VENOUS BLD VENIPUNCTURE: CPT

## 2022-06-07 RX ORDER — CLINDAMYCIN PHOSPHATE 900 MG/50ML
900 INJECTION INTRAVENOUS ONCE
Status: CANCELLED | OUTPATIENT
Start: 2022-06-20

## 2022-06-07 RX ORDER — ALPRAZOLAM 0.5 MG/1
0.5 TABLET ORAL 3 TIMES DAILY PRN
COMMUNITY
Start: 2021-12-16

## 2022-06-07 RX ORDER — FAMOTIDINE 40 MG/1
40 TABLET, FILM COATED ORAL NIGHTLY PRN
COMMUNITY
Start: 2022-04-18

## 2022-06-07 RX ORDER — PANTOPRAZOLE SODIUM 40 MG/1
40 TABLET, DELAYED RELEASE ORAL EVERY MORNING
COMMUNITY
Start: 2022-04-18

## 2022-06-07 ASSESSMENT — PAIN DESCRIPTION - FREQUENCY: FREQUENCY: CONTINUOUS

## 2022-06-07 ASSESSMENT — PAIN DESCRIPTION - LOCATION: LOCATION: BACK;HIP

## 2022-06-07 ASSESSMENT — PAIN DESCRIPTION - DESCRIPTORS: DESCRIPTORS: ACHING;SORE

## 2022-06-07 ASSESSMENT — PAIN DESCRIPTION - PAIN TYPE: TYPE: CHRONIC PAIN

## 2022-06-07 ASSESSMENT — PAIN DESCRIPTION - ORIENTATION: ORIENTATION: LOWER;RIGHT;LEFT

## 2022-06-07 ASSESSMENT — PAIN SCALES - GENERAL: PAINLEVEL_OUTOF10: 4

## 2022-06-07 NOTE — H&P
walk up a hill for 1-2 city blocks without SOB. Past Medical History:     Past Medical History:   Diagnosis Date    Asthma     COPD (chronic obstructive pulmonary disease) ([facility] [address]     COVID 11/2021    CTS (carpal tunnel syndrome)     Diabetes mellitus (HCC)     Endometrial disorder     Fibromyalgia     Gastroesophageal reflux disease without esophagitis 10/18/2016    Gastroparesis     GERD (gastroesophageal reflux disease)     Hepatic steatosis     History of blood transfusion 1980    Hx of blood clots     superficial blood clot leg     Hyperglycemia     Inguinal hernia     Migraine     Neurocardiogenic syncope     pt states resolved     Pancreas cyst     Pituitary adenoma ([facility] [address] 11/14/2016    PTSD (post-traumatic stress disorder)     RAD (reactive airway disease)     Stroke ([facility] [address] 1982    right side numbness speech difficulty resolved Dr. Mayank Armenta 15yrs ago         Past Surgical History:     Past Surgical History:   Procedure Laterality Date    BUNIONECTOMY Bilateral     CARDIAC CATHETERIZATION      negative no blockage     CARPAL TUNNEL RELEASE Right 08/22/2016    per Dr. Val Zepeda Left 09/12/2016    [facility]     CERVICAL SPINE SURGERY      x2    CHOLECYSTECTOMY      COLONOSCOPY  2010    DILATION AND CURETTAGE OF UTERUS  02/21/2018    EYE SURGERY      wandering eye x3    LAMINECTOMY      LAPAROSCOPY      OTHER SURGICAL HISTORY Right     SI joint fusion    SHOULDER SURGERY Right     ligament/tear    TUBAL LIGATION          Medications Prior to Admission:     Prior to Admission medications    Medication Sig Start Date End Date Taking? Authorizing Provider   ALPRAZolam Maria Guadalupe Smart) 0.5 MG tablet Take 0.5 mg by mouth 3 times daily as needed for Anxiety.   12/16/21  Yes Historical Provider, MD   famotidine (PEPCID) 40 MG tablet Take 40 mg by mouth nightly as needed  4/18/22   Historical Provider, MD   pantoprazole (PROTONIX) 40 MG tablet Take 40 mg by mouth every morning  4/18/22   Historical Provider, MD   fluticasone (FLONASE) 50 MCG/ACT nasal spray 1 spray by Nasal route 2 times daily for 14 days 3/2/18 3/16/18  Bartolome Martinez MD   brompheniramine-pseudoephedrine-DM 30-2-10 MG/5ML syrup Take 5 mLs by mouth 4 times daily as needed for Cough 3/2/18   Bartolome Martinez MD   metFORMIN (GLUCOPHAGE) 500 MG tablet Take 500 mg by mouth 2 times daily (with meals)    Historical Provider, MD   ondansetron (ZOFRAN-ODT) 4 MG disintegrating tablet DISSOLVE 1 TABLET IN MOUTH EVERY SIX HOURS AS NEEDED 8/23/17   Historical Provider, MD   zolpidem (AMBIEN) 10 MG tablet Take 5 mg by mouth nightly as needed for Sleep    Historical Provider, MD   Naproxen Sodium 220 MG CAPS Take 220 mg by mouth daily as needed for Pain  Patient not taking: Reported on 6/7/2022    Historical Provider, MD   aspirin-acetaminophen-caffeine (Rivera Bullion) 632-806-35 MG per tablet Take 1 tablet by mouth daily as needed for Headaches  Patient not taking: Reported on 6/7/2022    Historical Provider, MD   ibuprofen (ADVIL;MOTRIN) 200 MG tablet Take 800 mg by mouth as needed for Pain     Historical Provider, MD   Blood Glucose Monitoring Suppl (ONE TOUCH ULTRA 2) W/DEVICE KIT USE TO TEST BLOOD SUGAR DAILY AS DIRECTED 4/28/17   Historical Provider, MD   ONE TOUCH ULTRA TEST strip USE TO TEST TWICE DAILY AS DIRECTED 4/28/17   Historical Provider, MD Pham Langston LANCETS 28Y MISC USE TWICE DAILY AS DIRECTED 4/28/17   Historical Provider, MD   aspirin 81 MG tablet Take 81 mg by mouth daily  Patient not taking: Reported on 6/7/2022    Historical Provider, MD   albuterol sulfate HFA (VENTOLIN HFA) 108 (90 BASE) MCG/ACT inhaler Inhale 2 puffs into the lungs every 6 hours as needed for Wheezing  Patient not taking: Reported on 6/7/2022 4/5/17   Bartolome Martinez MD        Allergies:     Compazine [prochlorperazine maleate], Stelazine [trifluoperazine], Thorazine [chlorpromazine], Amoxicillin, Augmentin [amoxicillin-pot clavulanate], Bactrim [sulfamethoxazole-trimethoprim], Ciprofloxacin, Keflex [cephalexin], Pcn [penicillins], and Topamax [topiramate]    Social History:     Tobacco:    reports that she has never smoked. She has never used smokeless tobacco.  Alcohol:      reports no history of alcohol use. Drug Use:  reports no history of drug use. Family History:     Family History   Problem Relation Age of Onset    Other Mother         emphysema   Abiola See Stroke Mother     COPD Mother     Heart Disease Father     Cancer Sister         ovarian    Stroke Brother     Other Sister         lung issues    Other Maternal Uncle         hyperglycmeia       Review of Systems:     Positive and Negative as described in HPI. CONSTITUTIONAL: Hot flashes. Negative for fevers, chills, fatigue, and weight loss. HEENT: Pt occasionally wears glasses. Negative for hearing changes, rhinorrhea, and throat pain. RESPIRATORY: History of COPD, RAD, and asthma. Pt rarely uses her albuterol inhaler because it causes a migraine. Dyspnea while climbing stairs. Negative for cough, congestion, and wheezing. CARDIOVASCULAR: Superficial blood clot in the leg after shoulder surgery 15-16 years ago. Sternal chest pain with activity. Neurocardiogenic syncope. Negative for chest pain at this time, irregular heartbeat, and palpitations. GASTROINTESTINAL: Acid reflux. History of colon polyps. Next colonoscopy is due in 2 years. Occasional diarrhea. Occasional constipation. Negative for nausea, vomiting, and abdominal pain. GENITOURINARY: Frequency. Negative for difficulty of urination, burning with urination, hematuria, and urgency. INTEGUMENT: Scratches on arms from doing yard work. Negative for rash, open wounds, and easy bruising. HEMATOLOGIC/LYMPHATIC: Negative for swelling/edema. ALLERGIC/IMMUNOLOGIC: Negative for urticaria and itching. ENDOCRINE: Diabetes. Last HbA1c was 6.6% per pt.  Occasional increased thirst. Negative for heat or cold intolerance. MUSCULOSKELETAL: See HPI. Lower back and mid-back pain. S/p laminectomy. S/p 2 cervical surgeries. Fibromyalgia. NEUROLOGICAL: Migraines. Daily headaches. Bilateral arm numbness and tingling. History of a stroke in 1982 (Pt has residual \"word confusion\" and her right side \"is a little off\"). Dizziness and lightheadedness occur when the pt does not eat or if she quickly stands up. BEHAVIOR/PSYCH: Situational depression and anxiety. Physical Exam:   BP (!) 113/58   Pulse 60   Temp 97.8 °F (36.6 °C) (Temporal)   Resp 18   Ht 5' 2\" (1.575 m)   Wt 150 lb (68 kg)   SpO2 98%   BMI 27.44 kg/m²   No LMP recorded. Patient is postmenopausal.  No obstetric history on file. No results for input(s): POCGLU in the last 72 hours. General Appearance:  Alert, well appearing, and in no acute distress. Mental status: Oriented to person, place, and time. Head: Normocephalic and atraumatic. Eye: No icterus, redness, pupils equal and reactive, extraocular eye movements intact, and conjunctiva clear. Ear: Hearing grossly intact. Nose: No drainage noted. Mouth: Mucous membranes moist.  Neck: Supple and no carotid bruits noted. Lungs: Bilateral equal air entry, clear to auscultation, no wheezing, rales or rhonchi, and normal effort. Cardiovascular: Normal rate, regular rhythm, no murmur, gallop, or rub. Abdomen: Soft, nontender, nondistended, and active bowel sounds. Neurologic: Normal speech and cranial nerves II through XII grossly intact. Bilateral plantar flexion 5/5. Bilateral dorsiflexion 5/5. Skin: A few small scabbed abrasions on the right upper extremity and one small scabbed abrasion on the left upper extremity. No erythema surrounding the abrasions. No open wounds, rashes, bruising, or bleeding on exposed skin area. Extremities: Posterior tibial pulses 2+ bilaterally. No pedal edema. No calf tenderness with palpation. Psych: Normal affect.      Investigations: Laboratory Testing:  Recent Results (from the past 24 hour(s))   EKG 12 Lead    Collection Time: 06/07/22  7:08 AM   Result Value Ref Range    Ventricular Rate 62 BPM    Atrial Rate 62 BPM    P-R Interval 172 ms    QRS Duration 82 ms    Q-T Interval 452 ms    QTc Calculation (Bazett) 458 ms    P Axis 39 degrees    R Axis 42 degrees    T Axis 55 degrees   CBC with Auto Differential    Collection Time: 06/07/22  7:19 AM   Result Value Ref Range    WBC 4.6 3.5 - 11.3 k/uL    RBC 4.20 3.95 - 5.11 m/uL    Hemoglobin 12.3 11.9 - 15.1 g/dL    Hematocrit 38.8 36.3 - 47.1 %    MCV 92.4 82.6 - 102.9 fL    MCH 29.3 25.2 - 33.5 pg    MCHC 31.7 28.4 - 34.8 g/dL    RDW 13.2 11.8 - 14.4 %    Platelets 750 251 - 261 k/uL    MPV 9.6 8.1 - 13.5 fL    NRBC Automated 0.0 0.0 per 100 WBC    Seg Neutrophils 42 36 - 65 %    Lymphocytes 46 (H) 24 - 43 %    Monocytes 8 3 - 12 %    Eosinophils % 3 1 - 4 %    Basophils 1 0 - 2 %    Immature Granulocytes 0 0 %    Segs Absolute 1.95 1.50 - 8.10 k/uL    Absolute Lymph # 2.12 1.10 - 3.70 k/uL    Absolute Mono # 0.36 0.10 - 1.20 k/uL    Absolute Eos # 0.14 0.00 - 0.44 k/uL    Basophils Absolute 0.05 0.00 - 0.20 k/uL    Absolute Immature Granulocyte 0.00 0.00 - 0.30 k/uL   APTT    Collection Time: 06/07/22  7:19 AM   Result Value Ref Range    PTT 33.9 (H) 23.9 - 33.8 sec   Protime-INR    Collection Time: 06/07/22  7:19 AM   Result Value Ref Range    Protime 13.2 11.5 - 14.2 sec    INR 1.0    Basic Metabolic Panel    Collection Time: 06/07/22  7:19 AM   Result Value Ref Range    Glucose 117 (H) 70 - 99 mg/dL    BUN 16 8 - 23 mg/dL    CREATININE 0.77 0.50 - 0.90 mg/dL    Bun/Cre Ratio 21 (H) 9 - 20    Calcium 9.6 8.6 - 10.4 mg/dL    Sodium 141 135 - 144 mmol/L    Potassium 3.8 3.7 - 5.3 mmol/L    Chloride 106 98 - 107 mmol/L    CO2 27 20 - 31 mmol/L    Anion Gap 8 (L) 9 - 17 mmol/L    GFR Non-African American >60 >60 mL/min    GFR African American >60 >60 mL/min    GFR Comment         Urinalysis Collection Time: 22  7:49 AM   Result Value Ref Range    Color, UA Yellow Yellow    Turbidity UA Clear Clear    Glucose, Ur NEGATIVE NEGATIVE    Bilirubin Urine NEGATIVE NEGATIVE    Ketones, Urine NEGATIVE NEGATIVE    Specific Gravity, UA 1.020 1.005 - 1.030    Urine Hgb NEGATIVE NEGATIVE    pH, UA 5.5 5.0 - 8.0    Protein, UA NEGATIVE NEGATIVE    Urobilinogen, Urine Normal Normal    Nitrite, Urine NEGATIVE NEGATIVE    Leukocyte Esterase, Urine NEGATIVE NEGATIVE    Urinalysis Comments       Microscopic exam not performed based on chemical results unless requested in original order. Recent Labs     22  0719   HGB 12.3   HCT 38.8   WBC 4.6   MCV 92.4      K 3.8      CO2 27   BUN 16   CREATININE 0.77   GLUCOSE 117*   INR 1.0   PROTIME 13.2   APTT 33.9*       No results for input(s): COVID19 in the last 720 hours. *Please note that labs listed above are the most recent lab values available in EPIC at the time of the visit and additional labs may have been drawn or resulted since that time. EK22. See Epic. Diagnosis:      1. SI DYSFUNCTION     Plans:     1.  LEFT SI FUSION - SI BONE   RENAN Adams, APRN - CNP  2022  11:23 AM

## 2022-06-07 NOTE — PRE-PROCEDURE INSTRUCTIONS
ARRIVE AT [facility] ON Monday, June 20 at 0545 AM    Once you enter the hospital lobby, take the elevators to the second floor. Check-In is at the surgery registration desk. Continue to take your home medications as you normally do up to and including the night before surgery with the exception of any blood thinning medications. Please stop any blood thinning medications as directed by your surgeon or prescribing physician. Failure to stop certain medications may interfere with your scheduled surgery. These may include:  Aspirin, Warfarin (Coumadin), Clopidogrel (Plavix), Ibuprofen (Motrin, Advil), Naproxen (Aleve), Meloxicam (Mobic), Celecoxib (Celebrex), Eliquis, Pradaxa, Xarelto, Effient, Fish Oil, Herbal supplements. Stop Ibuprofen 7 days prior to surgery as directed by physician    If you are diabetic, do not take any of your diabetic medications by mouth the morning of surgery. If you are taking insulin contact the doctor that manages your diabetes for instructions about any changes to your insulin dosages the day before surgery. Do not inject insulin or other injectable diabetic medications the morning of surgery unless otherwise instructed by the doctor who manages your diabetes. Please take the following medication(s) the day of surgery with a small sip of water:  pantoprazole    Please use your inhaler(s) if needed and bring your inhaler(s) from home the day of surgery. PREPARING FOR YOUR SURGERY:     Before surgery, you can play an important role in your own health. Because skin is not sterile, we need to be sure that your skin is as free of germs as possible before surgery by carefully washing before surgery. Preparing or prepping skin before surgery can reduce the risk of a surgical site infection.   Do not shave the area of your body where your surgery will be performed unless you received specific permission from your physician.     You will need to shower at home the night before surgery and the morning of surgery with a special soap called chlorhexidine gluconate (CHG*). *Not to be used by people allergic to Chlorhexidine Gluconate (CHG). Following these instructions will help you be sure that your skin is clean before surgery. Instructions on cleaning your skin before surgery: The night before your surgery:      You will need to shower with warm water (not hot) and the CHG soap.  Use a clean wash cloth and a clean towel. Have clean clothes available to put on after the shower.   First wash your hair with regular shampoo. Rinse your hair and body thoroughly to remove the shampoo.  Wash your face and genital area (private parts) with your regular soap or water only. Thoroughly rinse your body with warm water from the neck down.  Turn water off to prevent rinsing the soap off too soon.  With a clean wet washcloth and half of the CHG soap in the bottle, lather your entire body from the neck down. Do not use CHG soap near your eyes or ears to avoid injury to those areas.  Wash thoroughly, paying special attention to the area where your surgery will be performed.  Wash your body gently for five (5) minutes. Avoid scrubbing your skin too hard.  Turn the water back on and rinse your body thoroughly.  Pat yourself dry with a clean, soft towel. Do not apply lotion, cream or powder.  Dress with clean freshly washed clothes. The morning of surgery:     Repeat shower following steps above - using remaining half of CHG soap in bottle. Patient Instructions:    Lucas If you are having any type of anesthesia you are to have nothing to eat or drink after midnight the night before your surgery. This includes gum, hard candy, mints, water or smoking or chewing tobacco.  The only exception to this is a small sip of water to take with any morning dose of heart, blood pressure, or seizure medications.   No alcoholic beverages for 24 hours prior to surgery.  Brush your teeth but do not swallow water.  Bring your eye glasses and case with you. No contacts are to be worn the day of surgery. You also may bring your hearing aids. Most surgical procedures involving anesthesia will require that you remove your dentures prior to surgery. · Do not wear any jewelry or body piercings day of surgery. Also, NO lotion, perfume or deodorant to be used the day of surgery. No nail polish on the operative extremity (arm/leg surgeries)    · If you are staying overnight with us, please bring a small bag of necessary personal items.  Please wear loose, comfortable clothing. If you are potentially going to have a cast or brace bring clothing that will fit over them.  In case of illness - If you have cold or flu like symptoms (high fever, runny nose, sore throat, cough, etc.) rash, nausea, vomiting, loose stools, and/or recent contact with someone who has a contagious disease (chicken pox, measles, etc.) Please call your doctor before coming to the hospital.         Day of Surgery/Procedure:    As a patient at [facility] you can expect quality medical and nursing care that is centered on your individual needs. Our goal is to make your surgical experience as comfortable as possible    . Transportation After Your Surgery/Procedure: You will need a friend or family member to drive you home after your procedure. Your  must be 25years of age or older and able to sign off on your discharge instructions. A taxi cab or any other form of public transportation is not acceptable. Your friend or family member must stay at the hospital throughout your procedure. Someone must remain with you for the first 24 hours after your surgery if you receive anesthesia or medication.   If you do not have someone to stay with you, your procedure may be cancelled.       If you have any other questions regarding your procedure or the day of surgery, please call [phone]      _________________________  ____________________________  Signature (Patient)              Signature (Provider)               Date

## 2022-06-08 LAB
CULTURE: NORMAL
MRSA, DNA, NASAL: NEGATIVE
SPECIMEN DESCRIPTION: NORMAL
SPECIMEN DESCRIPTION: NORMAL

## 2022-06-16 ENCOUNTER — HOSPITAL ENCOUNTER (OUTPATIENT)
Dept: LAB | Age: 62
Setting detail: SPECIMEN
Discharge: HOME | End: 2022-03-31

## 2022-12-09 ENCOUNTER — APPOINTMENT (OUTPATIENT)
Dept: GENERAL RADIOLOGY | Age: 62
End: 2022-12-09
Attending: ORTHOPAEDIC SURGERY
Payer: COMMERCIAL

## 2022-12-09 ENCOUNTER — ANESTHESIA (OUTPATIENT)
Dept: OPERATING ROOM | Age: 62
End: 2022-12-09
Payer: COMMERCIAL

## 2022-12-09 ENCOUNTER — HOSPITAL ENCOUNTER (OUTPATIENT)
Age: 62
Setting detail: OUTPATIENT SURGERY
Discharge: HOME | End: 2022-12-09
Attending: ORTHOPAEDIC SURGERY | Admitting: ORTHOPAEDIC SURGERY
Payer: COMMERCIAL

## 2022-12-09 ENCOUNTER — ANESTHESIA EVENT (OUTPATIENT)
Dept: OPERATING ROOM | Age: 62
End: 2022-12-09
Payer: COMMERCIAL

## 2022-12-09 VITALS
HEIGHT: 62 IN | BODY MASS INDEX: 29.08 KG/M2 | HEART RATE: 74 BPM | DIASTOLIC BLOOD PRESSURE: 65 MMHG | SYSTOLIC BLOOD PRESSURE: 140 MMHG | TEMPERATURE: 97.3 F | WEIGHT: 158 LBS | RESPIRATION RATE: 16 BRPM | OXYGEN SATURATION: 97 %

## 2022-12-09 DIAGNOSIS — M53.3 SACROILIAC JOINT DYSFUNCTION OF LEFT SIDE: Primary | Chronic | ICD-10-CM

## 2022-12-09 LAB
GLUCOSE BLD-MCNC: 136 MG/DL (ref 65–105)
GLUCOSE BLD-MCNC: 149 MG/DL (ref 65–105)

## 2022-12-09 PROCEDURE — 7100000001 HC PACU RECOVERY - ADDTL 15 MIN: Performed by: ORTHOPAEDIC SURGERY

## 2022-12-09 PROCEDURE — 3600000012 HC SURGERY LEVEL 2 ADDTL 15MIN: Performed by: ORTHOPAEDIC SURGERY

## 2022-12-09 PROCEDURE — 6360000002 HC RX W HCPCS: Performed by: NURSE ANESTHETIST, CERTIFIED REGISTERED

## 2022-12-09 PROCEDURE — 6370000000 HC RX 637 (ALT 250 FOR IP): Performed by: ORTHOPAEDIC SURGERY

## 2022-12-09 PROCEDURE — 2580000003 HC RX 258: Performed by: ORTHOPAEDIC SURGERY

## 2022-12-09 PROCEDURE — 2500000003 HC RX 250 WO HCPCS: Performed by: ORTHOPAEDIC SURGERY

## 2022-12-09 PROCEDURE — 6360000002 HC RX W HCPCS: Performed by: ORTHOPAEDIC SURGERY

## 2022-12-09 PROCEDURE — C1713 ANCHOR/SCREW BN/BN,TIS/BN: HCPCS | Performed by: ORTHOPAEDIC SURGERY

## 2022-12-09 PROCEDURE — 7100000000 HC PACU RECOVERY - FIRST 15 MIN: Performed by: ORTHOPAEDIC SURGERY

## 2022-12-09 PROCEDURE — 6370000000 HC RX 637 (ALT 250 FOR IP): Performed by: STUDENT IN AN ORGANIZED HEALTH CARE EDUCATION/TRAINING PROGRAM

## 2022-12-09 PROCEDURE — 2500000003 HC RX 250 WO HCPCS: Performed by: NURSE ANESTHETIST, CERTIFIED REGISTERED

## 2022-12-09 PROCEDURE — 3700000000 HC ANESTHESIA ATTENDED CARE: Performed by: ORTHOPAEDIC SURGERY

## 2022-12-09 PROCEDURE — 7100000011 HC PHASE II RECOVERY - ADDTL 15 MIN: Performed by: ORTHOPAEDIC SURGERY

## 2022-12-09 PROCEDURE — A4217 STERILE WATER/SALINE, 500 ML: HCPCS | Performed by: ORTHOPAEDIC SURGERY

## 2022-12-09 PROCEDURE — 2580000003 HC RX 258: Performed by: ANESTHESIOLOGY

## 2022-12-09 PROCEDURE — 6360000002 HC RX W HCPCS: Performed by: STUDENT IN AN ORGANIZED HEALTH CARE EDUCATION/TRAINING PROGRAM

## 2022-12-09 PROCEDURE — 82947 ASSAY GLUCOSE BLOOD QUANT: CPT

## 2022-12-09 PROCEDURE — 3209999900 FLUORO FOR SURGICAL PROCEDURES

## 2022-12-09 PROCEDURE — 7100000010 HC PHASE II RECOVERY - FIRST 15 MIN: Performed by: ORTHOPAEDIC SURGERY

## 2022-12-09 PROCEDURE — 3600000002 HC SURGERY LEVEL 2 BASE: Performed by: ORTHOPAEDIC SURGERY

## 2022-12-09 PROCEDURE — 2709999900 HC NON-CHARGEABLE SUPPLY: Performed by: ORTHOPAEDIC SURGERY

## 2022-12-09 PROCEDURE — 3700000001 HC ADD 15 MINUTES (ANESTHESIA): Performed by: ORTHOPAEDIC SURGERY

## 2022-12-09 DEVICE — IMPLANT SPNL L45MM DIA7MM SACROILIAC JT TI POR PLSM SPR: Type: IMPLANTABLE DEVICE | Site: HIP | Status: FUNCTIONAL

## 2022-12-09 DEVICE — IMPLANTABLE DEVICE: Type: IMPLANTABLE DEVICE | Site: HIP | Status: FUNCTIONAL

## 2022-12-09 RX ORDER — LABETALOL HYDROCHLORIDE 5 MG/ML
10 INJECTION, SOLUTION INTRAVENOUS
Status: DISCONTINUED | OUTPATIENT
Start: 2022-12-09 | End: 2022-12-09 | Stop reason: HOSPADM

## 2022-12-09 RX ORDER — ROCURONIUM BROMIDE 10 MG/ML
INJECTION, SOLUTION INTRAVENOUS PRN
Status: DISCONTINUED | OUTPATIENT
Start: 2022-12-09 | End: 2022-12-09 | Stop reason: SDUPTHER

## 2022-12-09 RX ORDER — PHENYLEPHRINE HCL IN 0.9% NACL 1 MG/10 ML
SYRINGE (ML) INTRAVENOUS PRN
Status: DISCONTINUED | OUTPATIENT
Start: 2022-12-09 | End: 2022-12-09 | Stop reason: SDUPTHER

## 2022-12-09 RX ORDER — SODIUM CHLORIDE 0.9 % (FLUSH) 0.9 %
5-40 SYRINGE (ML) INJECTION PRN
Status: DISCONTINUED | OUTPATIENT
Start: 2022-12-09 | End: 2022-12-09 | Stop reason: HOSPADM

## 2022-12-09 RX ORDER — OXYCODONE HYDROCHLORIDE 5 MG/1
5 TABLET ORAL
Status: COMPLETED | OUTPATIENT
Start: 2022-12-09 | End: 2022-12-09

## 2022-12-09 RX ORDER — MAGNESIUM CARB/ALUMINUM HYDROX 105-160MG
TABLET,CHEWABLE ORAL PRN
Status: DISCONTINUED | OUTPATIENT
Start: 2022-12-09 | End: 2022-12-09 | Stop reason: ALTCHOICE

## 2022-12-09 RX ORDER — MIDAZOLAM HYDROCHLORIDE 1 MG/ML
INJECTION INTRAMUSCULAR; INTRAVENOUS PRN
Status: DISCONTINUED | OUTPATIENT
Start: 2022-12-09 | End: 2022-12-09 | Stop reason: SDUPTHER

## 2022-12-09 RX ORDER — ONDANSETRON 2 MG/ML
INJECTION INTRAMUSCULAR; INTRAVENOUS PRN
Status: DISCONTINUED | OUTPATIENT
Start: 2022-12-09 | End: 2022-12-09 | Stop reason: SDUPTHER

## 2022-12-09 RX ORDER — PROPOFOL 10 MG/ML
INJECTION, EMULSION INTRAVENOUS PRN
Status: DISCONTINUED | OUTPATIENT
Start: 2022-12-09 | End: 2022-12-09 | Stop reason: SDUPTHER

## 2022-12-09 RX ORDER — ONDANSETRON 2 MG/ML
4 INJECTION INTRAMUSCULAR; INTRAVENOUS
Status: COMPLETED | OUTPATIENT
Start: 2022-12-09 | End: 2022-12-09

## 2022-12-09 RX ORDER — LIDOCAINE HYDROCHLORIDE 20 MG/ML
INJECTION, SOLUTION EPIDURAL; INFILTRATION; INTRACAUDAL; PERINEURAL PRN
Status: DISCONTINUED | OUTPATIENT
Start: 2022-12-09 | End: 2022-12-09 | Stop reason: SDUPTHER

## 2022-12-09 RX ORDER — OXYCODONE HYDROCHLORIDE AND ACETAMINOPHEN 5; 325 MG/1; MG/1
1-2 TABLET ORAL EVERY 4 HOURS PRN
Qty: 60 TABLET | Refills: 0 | Status: SHIPPED | OUTPATIENT
Start: 2022-12-09 | End: 2022-12-16

## 2022-12-09 RX ORDER — HYDROMORPHONE HYDROCHLORIDE 1 MG/ML
0.5 INJECTION, SOLUTION INTRAMUSCULAR; INTRAVENOUS; SUBCUTANEOUS EVERY 5 MIN PRN
Status: DISCONTINUED | OUTPATIENT
Start: 2022-12-09 | End: 2022-12-09 | Stop reason: HOSPADM

## 2022-12-09 RX ORDER — METOCLOPRAMIDE HYDROCHLORIDE 5 MG/ML
10 INJECTION INTRAMUSCULAR; INTRAVENOUS
Status: COMPLETED | OUTPATIENT
Start: 2022-12-09 | End: 2022-12-09

## 2022-12-09 RX ORDER — LIDOCAINE HYDROCHLORIDE 10 MG/ML
1 INJECTION, SOLUTION EPIDURAL; INFILTRATION; INTRACAUDAL; PERINEURAL
Status: DISCONTINUED | OUTPATIENT
Start: 2022-12-10 | End: 2022-12-09 | Stop reason: HOSPADM

## 2022-12-09 RX ORDER — SODIUM CHLORIDE 0.9 % (FLUSH) 0.9 %
5-40 SYRINGE (ML) INJECTION EVERY 12 HOURS SCHEDULED
Status: DISCONTINUED | OUTPATIENT
Start: 2022-12-09 | End: 2022-12-09 | Stop reason: HOSPADM

## 2022-12-09 RX ORDER — ACETAMINOPHEN 500 MG
1000 TABLET ORAL ONCE
Status: DISCONTINUED | OUTPATIENT
Start: 2022-12-09 | End: 2022-12-09 | Stop reason: HOSPADM

## 2022-12-09 RX ORDER — SODIUM CHLORIDE, SODIUM LACTATE, POTASSIUM CHLORIDE, CALCIUM CHLORIDE 600; 310; 30; 20 MG/100ML; MG/100ML; MG/100ML; MG/100ML
INJECTION, SOLUTION INTRAVENOUS CONTINUOUS
Status: DISCONTINUED | OUTPATIENT
Start: 2022-12-10 | End: 2022-12-09 | Stop reason: HOSPADM

## 2022-12-09 RX ORDER — BUPIVACAINE HYDROCHLORIDE AND EPINEPHRINE 5; 5 MG/ML; UG/ML
INJECTION, SOLUTION EPIDURAL; INTRACAUDAL; PERINEURAL PRN
Status: DISCONTINUED | OUTPATIENT
Start: 2022-12-09 | End: 2022-12-09 | Stop reason: ALTCHOICE

## 2022-12-09 RX ORDER — HYDROMORPHONE HYDROCHLORIDE 1 MG/ML
0.25 INJECTION, SOLUTION INTRAMUSCULAR; INTRAVENOUS; SUBCUTANEOUS EVERY 5 MIN PRN
Status: DISCONTINUED | OUTPATIENT
Start: 2022-12-09 | End: 2022-12-09 | Stop reason: HOSPADM

## 2022-12-09 RX ORDER — SODIUM CHLORIDE 9 MG/ML
INJECTION, SOLUTION INTRAVENOUS PRN
Status: DISCONTINUED | OUTPATIENT
Start: 2022-12-09 | End: 2022-12-09 | Stop reason: HOSPADM

## 2022-12-09 RX ORDER — CLINDAMYCIN PHOSPHATE 900 MG/50ML
900 INJECTION INTRAVENOUS ONCE
Status: COMPLETED | OUTPATIENT
Start: 2022-12-09 | End: 2022-12-09

## 2022-12-09 RX ORDER — FENTANYL CITRATE 50 UG/ML
INJECTION, SOLUTION INTRAMUSCULAR; INTRAVENOUS PRN
Status: DISCONTINUED | OUTPATIENT
Start: 2022-12-09 | End: 2022-12-09 | Stop reason: SDUPTHER

## 2022-12-09 RX ORDER — VANCOMYCIN HYDROCHLORIDE 1 G/20ML
INJECTION, POWDER, LYOPHILIZED, FOR SOLUTION INTRAVENOUS PRN
Status: DISCONTINUED | OUTPATIENT
Start: 2022-12-09 | End: 2022-12-09 | Stop reason: ALTCHOICE

## 2022-12-09 RX ORDER — DEXAMETHASONE SODIUM PHOSPHATE 10 MG/ML
INJECTION, SOLUTION INTRAMUSCULAR; INTRAVENOUS PRN
Status: DISCONTINUED | OUTPATIENT
Start: 2022-12-09 | End: 2022-12-09 | Stop reason: SDUPTHER

## 2022-12-09 RX ADMIN — Medication 50 MCG: at 11:08

## 2022-12-09 RX ADMIN — MIDAZOLAM 2 MG: 1 INJECTION INTRAMUSCULAR; INTRAVENOUS at 10:59

## 2022-12-09 RX ADMIN — CLINDAMYCIN IN 5 PERCENT DEXTROSE 900 MG: 18 INJECTION, SOLUTION INTRAVENOUS at 11:15

## 2022-12-09 RX ADMIN — ONDANSETRON 4 MG: 2 INJECTION INTRAMUSCULAR; INTRAVENOUS at 12:54

## 2022-12-09 RX ADMIN — SODIUM CHLORIDE, POTASSIUM CHLORIDE, SODIUM LACTATE AND CALCIUM CHLORIDE: 600; 310; 30; 20 INJECTION, SOLUTION INTRAVENOUS at 09:50

## 2022-12-09 RX ADMIN — PROPOFOL 150 MG: 10 INJECTION, EMULSION INTRAVENOUS at 11:08

## 2022-12-09 RX ADMIN — ROCURONIUM BROMIDE 40 MG: 10 INJECTION, SOLUTION INTRAVENOUS at 11:08

## 2022-12-09 RX ADMIN — SUGAMMADEX 200 MG: 100 INJECTION, SOLUTION INTRAVENOUS at 12:17

## 2022-12-09 RX ADMIN — METOCLOPRAMIDE 10 MG: 5 INJECTION, SOLUTION INTRAMUSCULAR; INTRAVENOUS at 13:18

## 2022-12-09 RX ADMIN — Medication 100 MCG: at 12:05

## 2022-12-09 RX ADMIN — OXYCODONE 5 MG: 5 TABLET ORAL at 13:38

## 2022-12-09 RX ADMIN — Medication 50 MCG: at 12:26

## 2022-12-09 RX ADMIN — LIDOCAINE HYDROCHLORIDE 60 MG: 20 INJECTION, SOLUTION EPIDURAL; INFILTRATION; INTRACAUDAL; PERINEURAL at 11:08

## 2022-12-09 RX ADMIN — DEXAMETHASONE SODIUM PHOSPHATE 10 MG: 10 INJECTION INTRAMUSCULAR; INTRAVENOUS at 11:15

## 2022-12-09 RX ADMIN — ONDANSETRON 4 MG: 2 INJECTION INTRAMUSCULAR; INTRAVENOUS at 12:04

## 2022-12-09 RX ADMIN — Medication 100 MCG: at 12:08

## 2022-12-09 ASSESSMENT — PAIN SCALES - GENERAL
PAINLEVEL_OUTOF10: 4
PAINLEVEL_OUTOF10: 8
PAINLEVEL_OUTOF10: 5
PAINLEVEL_OUTOF10: 5

## 2022-12-09 ASSESSMENT — PAIN DESCRIPTION - LOCATION
LOCATION: BACK;HIP
LOCATION: BACK;HIP

## 2022-12-09 ASSESSMENT — COPD QUESTIONNAIRES: CAT_SEVERITY: MILD

## 2022-12-09 ASSESSMENT — PAIN DESCRIPTION - ORIENTATION
ORIENTATION: LEFT;LOWER
ORIENTATION: LEFT;LOWER

## 2022-12-09 ASSESSMENT — PAIN - FUNCTIONAL ASSESSMENT: PAIN_FUNCTIONAL_ASSESSMENT: 0-10

## 2022-12-09 ASSESSMENT — PAIN DESCRIPTION - PAIN TYPE: TYPE: SURGICAL PAIN

## 2022-12-09 NOTE — H&P
Interval H&P Note    Pt Name: Carl Cavazos  MRN: [id number]  YOB: 1960  Date of evaluation: 12/9/2022      [x] I have reviewed the hard copy primary care progress note by Dr. Jewell James dated 12/5/2022 labeled in paper chart for an Interval History and Physical note. [x] I have examined  Carl Cavazos  There are no changes to the patient who is scheduled for LEFT SI JOINT FUSION PERCUTANEOUS - SI BONE   RENAN by Geeta Arnett MD for SI (sacroiliac) joint dysfunction [M53.3]. The patient denies new health changes, fever, chills, wheezing, cough, increased SOB, chest pain, open sores or wounds. Hx diabetes. POC . Denies taking any blood thinning medications in the last 10 days. Vital signs: /60   Pulse 61   Temp 97.8 °F (36.6 °C) (Temporal)   Resp 18   Ht 5' 2\" (1.575 m)   Wt 158 lb (71.7 kg)   SpO2 97%   BMI 28.90 kg/m²     Allergies:  Compazine [prochlorperazine maleate], Stelazine [trifluoperazine], Thorazine [chlorpromazine], Amoxicillin, Augmentin [amoxicillin-pot clavulanate], Bactrim [sulfamethoxazole-trimethoprim], Ciprofloxacin, Keflex [cephalexin], Pcn [penicillins], and Topamax [topiramate]    Medications:    Prior to Admission medications    Medication Sig Start Date End Date Taking? Authorizing Provider   ALPRAZolam Claudio Gage) 0.5 MG tablet Take 0.5 mg by mouth 3 times daily as needed for Anxiety.   12/16/21   Historical Provider, MD   famotidine (PEPCID) 40 MG tablet Take 40 mg by mouth nightly as needed  4/18/22   Historical Provider, MD   pantoprazole (PROTONIX) 40 MG tablet Take 40 mg by mouth every morning  4/18/22   Historical Provider, MD   fluticasone (FLONASE) 50 MCG/ACT nasal spray 1 spray by Nasal route 2 times daily for 14 days 3/2/18 12/9/22  Danielle Madison MD   brompheniramine-pseudoephedrine-DM 30-2-10 MG/5ML syrup Take 5 mLs by mouth 4 times daily as needed for Cough  Patient not taking: No sig reported 3/2/18   Danielle Madison MD   metFORMIN (GLUCOPHAGE) 500 MG tablet Take 500 mg by mouth 2 times daily (with meals)    Historical Provider, MD   ondansetron (ZOFRAN-ODT) 4 MG disintegrating tablet DISSOLVE 1 TABLET IN MOUTH EVERY SIX HOURS AS NEEDED 8/23/17   Historical Provider, MD   zolpidem (AMBIEN) 10 MG tablet Take 5 mg by mouth nightly as needed for Sleep    Historical Provider, MD   aspirin-acetaminophen-caffeine (You Jack) [phone] MG per tablet Take 1 tablet by mouth daily as needed for Headaches    Historical Provider, MD   ibuprofen (ADVIL;MOTRIN) 200 MG tablet Take 800 mg by mouth as needed for Pain     Historical Provider, MD   Blood Glucose Monitoring Suppl (ONE TOUCH ULTRA 2) W/DEVICE KIT USE TO TEST BLOOD SUGAR DAILY AS DIRECTED 4/28/17   Historical Provider, MD   ONE TOUCH ULTRA TEST strip USE TO TEST TWICE DAILY AS DIRECTED 4/28/17   Historical Provider, MD Delia Perez LANCETS 10N MISC USE TWICE DAILY AS DIRECTED 4/28/17   Historical Provider, MD   albuterol sulfate HFA (VENTOLIN HFA) 108 (90 BASE) MCG/ACT inhaler Inhale 2 puffs into the lungs every 6 hours as needed for Wheezing  Patient not taking: No sig reported 4/5/17   Linus Sandhoff, MD         Past Medical History:     Past Medical History:   Diagnosis Date    Asthma     COPD (chronic obstructive pulmonary disease) [facility]     COVID 11/2021    CTS (carpal tunnel syndrome)     Diabetes mellitus ([facility]     Endometrial disorder     Fibromyalgia     Gastroesophageal reflux disease without esophagitis 10/18/2016    Gastroparesis     GERD (gastroesophageal reflux disease)     Hepatic steatosis     History of blood transfusion 1980    Child birth    Hx of blood clots     superficial blood clot leg     Hyperglycemia     Inguinal hernia     Migraine     Neurocardiogenic syncope     pt states resolved     Pancreas cyst     Pituitary adenoma ([facility] 11/14/2016    PTSD (post-traumatic stress disorder)     RAD (reactive airway disease)     Stroke ([facility] 1982    right side numbness speech difficulty resolved Dr. Olen Nyhan 15yrs ago         Past Surgical History:     Past Surgical History:   Procedure Laterality Date    BUNIONECTOMY Bilateral     CARDIAC CATHETERIZATION      negative no blockage     CARPAL TUNNEL RELEASE Right 08/22/2016    per Dr. Boss Pi Left 09/12/2016    [facility]     CERVICAL SPINE SURGERY      x2    CHOLECYSTECTOMY      COLONOSCOPY  2010    COLONOSCOPY  2022    Dr. Марина Santos  02/21/2018    EYE SURGERY      wandering eye x3    LAMINECTOMY      LAPAROSCOPY      OTHER SURGICAL HISTORY Right     SI joint fusion    SHOULDER SURGERY Right     ligament/tear    TUBAL LIGATION         Social History:     Social History     Socioeconomic History    Marital status:      Spouse name: None    Number of children: None    Years of education: None    Highest education level: None   Tobacco Use    Smoking status: Never    Smokeless tobacco: Never   Vaping Use    Vaping Use: Never used   Substance and Sexual Activity    Alcohol use: No    Drug use: No    Sexual activity: Yes     Partners: Male       Family History:     Family History   Problem Relation Age of Onset    Other Mother         emphysema    Stroke Mother     COPD Mother     Heart Disease Father     Cancer Sister         ovarian    Stroke Brother     Other Sister         lung issues    Other Maternal Uncle         hyperglycmeia     This is a 60 y.o. female who is pleasant, cooperative, alert and oriented x3, in no acute distress. Heart: Heart sounds are normal.  HR 61 regular rate and rhythm without murmur, gallop or rub. Lungs: Normal respiratory effort with equal expansion, good air exchange, unlabored and clear to auscultation without wheezes or rales bilaterally   Abdomen: soft, nontender, nondistended with bowel sounds active. Extremities: equal strength bilaterally. Pedal pulses palpable.       Labs:  No results for input(s): HGB, HCT, WBC, MCV, PLT, NA, K, CL, CO2, BUN, CREATININE, GLUCOSE, INR, PROTIME, APTT, AST, ALT, LABALBU, HCG in the last 720 hours. No results for input(s): COVID19 in the last 720 hours.     HANSA Rosen CNP  Electronically signed 12/9/2022 at 9:57 AM

## 2022-12-09 NOTE — DISCHARGE INSTRUCTIONS
PWB LLE x 3 wks  Dry dressing changes daily x 1wk  Stiches are dissolvable and do not need to be removed  Call for any fevers, wound redness, swelling or drainage after 4 days [phone]  May shower in 2 days  No tub baths for 3 weeks        MD Alpesh OrozcoCHRISTUS [facility]  Spine Surgeon  [phone]

## 2022-12-09 NOTE — ANESTHESIA PRE PROCEDURE
Department of Anesthesiology  Preprocedure Note       Name:  Shantell Fonseca   Age:  60 y.o.  :  1960                                          MRN:  [id number]         Date:  2022      Surgeon: Gwendolyn Fuentes):  Beth Pate MD    Procedure: Procedure(s):  LEFT SI JOINT FUSION PERCUTANEOUS - SI BONE   RENAN    Medications prior to admission:   Prior to Admission medications    Medication Sig Start Date End Date Taking? Authorizing Provider   ALPRAZolam [place] Fiddler) 0.5 MG tablet Take 0.5 mg by mouth 3 times daily as needed for Anxiety.   21   Historical Provider, MD   famotidine (PEPCID) 40 MG tablet Take 40 mg by mouth nightly as needed  22   Historical Provider, MD   pantoprazole (PROTONIX) 40 MG tablet Take 40 mg by mouth every morning  22   Historical Provider, MD   fluticasone (FLONASE) 50 MCG/ACT nasal spray 1 spray by Nasal route 2 times daily for 14 days 3/2/18 12/9/22  Ankit Garg MD   brompheniramine-pseudoephedrine-DM 30-2-10 MG/5ML syrup Take 5 mLs by mouth 4 times daily as needed for Cough  Patient not taking: No sig reported 3/2/18   Ankit Garg MD   metFORMIN (GLUCOPHAGE) 500 MG tablet Take 500 mg by mouth 2 times daily (with meals)    Historical Provider, MD   ondansetron (ZOFRAN-ODT) 4 MG disintegrating tablet DISSOLVE 1 TABLET IN MOUTH EVERY SIX HOURS AS NEEDED 17   Historical Provider, MD   zolpidem (AMBIEN) 10 MG tablet Take 5 mg by mouth nightly as needed for Sleep    Historical Provider, MD   aspirin-acetaminophen-caffeine (Mohinio Miguelito) [phone] MG per tablet Take 1 tablet by mouth daily as needed for Headaches    Historical Provider, MD   ibuprofen (ADVIL;MOTRIN) 200 MG tablet Take 800 mg by mouth as needed for Pain     Historical Provider, MD   Blood Glucose Monitoring Suppl (ONE TOUCH ULTRA 2) W/DEVICE KIT USE TO TEST BLOOD SUGAR DAILY AS DIRECTED 17   Historical Provider, MD   ONE TOUCH ULTRA TEST strip USE TO TEST TWICE DAILY AS DIRECTED 17 Historical Provider, MD [place] LANCETS [id number] [facility] USE TWICE DAILY AS DIRECTED 17   Historical Provider, MD   albuterol sulfate HFA (VENTOLIN HFA) 108 (90 BASE) MCG/ACT inhaler Inhale 2 puffs into the lungs every 6 hours as needed for Wheezing  Patient not taking: No sig reported 17   Deyanira Rucker MD       Current medications:    Current Facility-Administered Medications   Medication Dose Route Frequency Provider Last Rate Last Admin    [START ON 12/10/2022] lidocaine PF 1 % injection 1 mL  1 mL IntraDERmal Once PRN James Lainez MD       Lue Tima Moeller ON 12/10/2022] lactated ringers infusion   IntraVENous Continuous James Lainez MD 50 mL/hr at 22 0950 New Bag at 22 0950    sodium chloride flush 0.9 % injection 5-40 mL  5-40 mL IntraVENous 2 times per day James Lainez MD        sodium chloride flush 0.9 % injection 5-40 mL  5-40 mL IntraVENous PRN James Lainez MD        0.9 % sodium chloride infusion   IntraVENous PRN James Lainez MD        clindamycin (CLEOCIN) 900 mg in dextrose 5 % 50 mL IVPB  900 mg IntraVENous Once Chris Eckert MD           Allergies:     Allergies   Allergen Reactions    Compazine [Prochlorperazine Maleate] Anaphylaxis    Stelazine [Trifluoperazine] Anaphylaxis    Thorazine [Chlorpromazine] Anaphylaxis    Amoxicillin Hives and Itching    Augmentin [Amoxicillin-Pot Clavulanate]     Bactrim [Sulfamethoxazole-Trimethoprim]     Ciprofloxacin     Keflex [Cephalexin]     Pcn [Penicillins] Itching    Topamax [Topiramate]        Problem List:    Patient Active Problem List   Diagnosis Code    Carpal tunnel syndrome of right wrist G56.01    Gastroesophageal reflux disease without esophagitis K21.9    Post menopausal syndrome N95.1    Gastroparesis K31.84    Hepatic steatosis K76.0    Pancreas cyst K86.2    Type 2 diabetes mellitus without complication, without long-term current use of insulin (HCC) E11.9    Trochanteric bursitis of right hip M70.61    Radicular leg pain M54.10    Hip pain, right M25.551    Lumbar stenosis M48.061    Chronic midline low back pain without sciatica M54.50, G89.29    Trigger middle finger of right hand M65.331    Lumbar radiculopathy M54.16       Past Medical History:        Diagnosis Date    Asthma     COPD [facility] obstructive [facility]) [facility]     COVID 11/2021    CTS (carpal tunnel syndrome)     Diabetes mellitus ([facility]     Endometrial disorder     Fibromyalgia     Gastroesophageal reflux disease without esophagitis 10/18/2016    Gastroparesis     GERD (gastroesophageal reflux disease)     Hepatic steatosis     History of blood transfusion 1980    Child birth    Hx of blood clots     superficial blood clot leg     Hyperglycemia     Inguinal hernia     Migraine     Neurocardiogenic syncope     pt states resolved     Pancreas cyst     Pituitary adenoma ([facility] 11/14/2016    PTSD (post-traumatic stress disorder)     RAD (reactive airway disease)     Stroke ([facility] 1982    right side numbness speech difficulty resolved Dr. Annelise Rucker 15yrs ago        Past Surgical History:        Procedure Laterality Date    BUNIONECTOMY Bilateral     CARDIAC CATHETERIZATION      negative no blockage     CARPAL TUNNEL RELEASE Right 08/22/2016    per Dr. Yi Dash Left 09/12/2016    [facility]     CERVICAL SPINE SURGERY      x2    CHOLECYSTECTOMY      COLONOSCOPY  2010    COLONOSCOPY  2022    Dr. Nate Stern  02/21/2018    EYE SURGERY      wandering eye x3    LAMINECTOMY      LAPAROSCOPY      OTHER SURGICAL HISTORY Right     SI joint fusion    SHOULDER SURGERY Right     ligament/tear    TUBAL LIGATION         Social History:    Social History     Tobacco Use    Smoking status: Never    Smokeless tobacco: Never   Substance Use Topics    Alcohol use:  No                                Counseling given: Not Answered      Vital Signs (Current):   Vitals: 12/09/22 0922 12/09/22 0935   BP: 139/60    Pulse: 61    Resp: 18    Temp: 97.8 °F (36.6 °C)    TempSrc: Temporal    SpO2: 97%    Weight:  158 lb (71.7 kg)   Height:  5' 2\" (1.575 m)                                              BP Readings from Last 3 Encounters:   12/09/22 139/60   06/07/22 (!) 113/58   01/08/21 (!) 146/61       NPO Status: Time of last liquid consumption: 0730 (sip with med)                        Time of last solid consumption: 2000                        Date of last liquid consumption: 12/09/22                        Date of last solid food consumption: 12/08/22    BMI:   Wt Readings from Last 3 Encounters:   12/09/22 158 lb (71.7 kg)   12/05/22 158 lb (71.7 kg)   06/07/22 150 lb (68 kg)     Body mass index is 28.9 kg/m².     CBC:   Lab Results   Component Value Date/Time    WBC 4.6 06/07/2022 07:19 AM    RBC 4.20 06/07/2022 07:19 AM    RBC 4.55 08/06/2021 09:25 AM    RBC 0-2 08/06/2021 09:25 AM    HGB 12.3 06/07/2022 07:19 AM    HCT 38.8 06/07/2022 07:19 AM    MCV 92.4 06/07/2022 07:19 AM    RDW 13.2 06/07/2022 07:19 AM     06/07/2022 07:19 AM       CMP:   Lab Results   Component Value Date/Time     06/07/2022 07:19 AM    K 3.8 06/07/2022 07:19 AM     06/07/2022 07:19 AM    CO2 27 06/07/2022 07:19 AM    BUN 16 06/07/2022 07:19 AM    CREATININE 0.77 06/07/2022 07:19 AM    GFRAA >60 06/07/2022 07:19 AM    LABGLOM >60 06/07/2022 07:19 AM    GLUCOSE 117 06/07/2022 07:19 AM    GLUCOSE 87 08/06/2021 09:25 AM    PROT 7.5 04/24/2017 01:31 PM    CALCIUM 9.6 06/07/2022 07:19 AM    BILITOT 0.6 04/24/2017 01:31 PM    ALKPHOS 101 04/24/2017 01:31 PM    ALKPHOS 67 10/14/2016 10:40 PM    AST 49 04/24/2017 01:31 PM    ALT 43 04/24/2017 01:31 PM       POC Tests:   Recent Labs     12/09/22  0947   POCGLU 149*       Coags:   Lab Results   Component Value Date/Time    PROTIME 13.2 06/07/2022 07:19 AM    PROTIME 12.9 08/06/2021 09:25 AM    INR 1.0 06/07/2022 07:19 AM    APTT 33.9 06/07/2022 07:19 AM    APTT 33.7 08/06/2021 09:25 AM       HCG (If Applicable):   Lab Results   Component Value Date    PREGTESTUR negative 12/12/2017        ABGs: No results found for: PHART, PO2ART, CLJ1GBN, HBG5OJS, BEART, N5FJILIB     Type & Screen (If Applicable):  No results found for: LABABO, LABRH    Drug/Infectious Status (If Applicable):  No results found for: HIV, HEPCAB    COVID-19 Screening (If Applicable): No results found for: COVID19        Anesthesia Evaluation  Patient summary reviewed no history of anesthetic complications:   Airway: Mallampati: II  TM distance: >3 FB   Neck ROM: full  Mouth opening: > = 3 FB   Dental: normal exam         Pulmonary:   (+) COPD: mild,  asthma: seasonal asthma,                            Cardiovascular:  Exercise tolerance: good (>4 METS),       (-) valvular problems/murmurs, past MI, CABG/stent, dysrhythmias and  angina       Beta Blocker:  Not on Beta Blocker         Neuro/Psych:   (+) CVA (No residual (prior right numbness/speech) - associated with pregnancy):, psychiatric history (PTSD):   (-) seizures            ROS comment: Lumbar back pain, fibromyalgia GI/Hepatic/Renal:   (+) GERD:,      (-) liver disease and no renal disease      ROS comment: Gastroparesis. Endo/Other:    (+) DiabetesType II DM, , .    (-) hypothyroidism               Abdominal:             Vascular:     - DVT and PE. Other Findings:           Anesthesia Plan      general     ASA 2       Induction: intravenous. MIPS: Postoperative opioids intended and Prophylactic antiemetics administered. Anesthetic plan and risks discussed with patient. Plan discussed with CRNA.     Attending anesthesiologist reviewed and agrees with Massimo Mike MD   12/9/2022

## 2022-12-09 NOTE — ANESTHESIA POSTPROCEDURE EVALUATION
Department of Anesthesiology  Postprocedure Note    Patient: Mandy Freeman  MRN: [id number]  YOB: 1960  Date of evaluation: 12/9/2022      Procedure Summary     Date: 12/09/22 Room / Location: [facility] OR 03 / [facility] - INPATIENT    Anesthesia Start: 1059 Anesthesia Stop: 9569    Procedure: LEFT SI JOINT FUSION PERCUTANEOUS - SI BONE   RENAN (Left: Hip) Diagnosis:       SI (sacroiliac) joint dysfunction      (SI (sacroiliac) joint dysfunction [M53.3])    Surgeons: Ruthy Gutierrez MD Responsible Provider: Asad Coronel MD    Anesthesia Type: general ASA Status: 2          Anesthesia Type: No value filed.     Jesse Phase I: Jesse Score: 10    Jesse Phase II: Jesse Score: 10      Anesthesia Post Evaluation    Patient location during evaluation: PACU  Patient participation: complete - patient participated  Level of consciousness: awake  Airway patency: patent  Nausea & Vomiting: no nausea and no vomiting  Complications: no  Cardiovascular status: hemodynamically stable  Respiratory status: acceptable  Hydration status: stable  Multimodal analgesia pain management approach

## 2022-12-11 NOTE — OP NOTE
Operative Note      Patient: Jake Lamar  YOB: 1960  MRN: [id number]    Date of Procedure: 12/9/2022    SURGEON:  Gokul Harmon MD.     ANESTHESIA:  General endotracheal anesthesia. PREOPERATIVE DIAGNOSIS:  left sacroiliac dysfunction. POSTOPERATIVE DIAGNOSIS:  left sacroiliac dysfunction. PROCEDURE:  1. left minimally-invasive sacroiliac joint fusion utilizing      the iFuse implant system. 2. Intraoperative use of C-arm fluoroscopy. ESTIMATED BLOOD LOSS:  10 mL. FLUIDS:  Per anesthesia record. COMPLICATIONS:  None. INDICATIONS:  This is a pleasant 60-year-old female  with  history of SI joint dysfunction and a  chronic pain  situation. She had done extensive conservative management in terms  of workup for her low back as well as her sacroiliac joints. She  had multiple injections in the sacroiliac joints which did  confirm relived pain. She was having the  continued pain on the left side and had failed conservative  management. It was discussed with him the option of performing  sacroiliac joint fusion on that left side as well. Risk and  benefits were discussed including bleeding, infection, injury to  nerves, vessels, anesthetic risk, the need for possible further  future surgery, as well as the possibility for continued pain,  continued symptoms, possible nonunion. She did understand all  these risks and did wish to proceed and informed consent was  obtained. DESCRIPTION OF PROCEDURE:  The patient was taken to the operating  room, kept supine on his bed. She was intubated, placed under  general anesthesia by the anesthesiologist. She was given  preoperative antibiotic prophylaxis. [facility] towel beneath his chest and pelvis. All  bony prominences were padded. Eyes were kept free of any  pressure. Brachial plexus and elbows were padded as well.  She was  then taped in this position on to the table and the right buttock  and lower back were then prepped and draped in the sterile  fashion. At this point, C-arm fluoroscopy was then brought in in  the sacral ala as well as in line with the sacrum itself was drawn  on the lateral aspect of the skin. Once the appropriate area was  marked, this incision was marked out with a marking pen and this  area was infiltrated with 0.5% Marcaine with epinephrine. Approximately, a 3-cm incision was made along the left lateral  buttock region. Dissection was carried down to the subcutaneous  tissues. Blunt finger dissection was used to carry down through  the subcu as well. At this point, a guidewire was placed through  this opening and placed in a reasonable starting position just  below the sacral ala and inline with the sacrum itself. Lateral  picture confirmed it to be in appropriate position and the mallet  was used to tap the guidewire into the ilium. At this point, the  C-arm was brought into an inlet view and alignment was seen which  showed appropriate trajectory across the SI joint. An outlet view  was then obtained as well, and again showed appropriate  trajectory across the SI joint and above the first sacral  foramen. At this point, the pin  was used to drive the pin  across the SI joint and above the first sacral foramen. The  dilator was placed followed by the guide. Guide was placed over  the guidewire. The pin was then measured and shortened in  appropriate length implant. The inner cannula was then removed. Drill was then used to drill over the guidewire across the SI  joint followed by the broach. Once these were removed, the  implant was then placed over the guidewire and malleted it across  the SI joint under C-arm guidance. Once this was in good  position, the cannula was then removed. Pictures did confirm the  implant to be in excellent position.  At this point, the jig was  placed over the previous guidewire and a new guidewire was placed  distal and slightly more anterior to the first one in line with  the sacroiliac joint in exact same fashion. Drill was placed  across the joint utilizing fluoroscopy followed by placement of  the drill and broach, and finally placement of the implant. Total of 2 implants were placed across the  sacroiliac joint on the left side and guidewires were  subsequently removed. Final pictures were taken which showed all  implants to be in excellent position in outlet views as well as  lateral views. Once this was done, the wound was irrigated with  sterile normal saline with bacitracin followed by placement of  vancomycin powder and closure was then performed with 0-Vicryl  and 2-0 Vicryl, and running 4-0 Monocryl suture with Dermabond  glue. Standard dressing was then applied. She was then placed  supine on the bed, extubated, and taken to recovery room in  stable condition.     Electronically signed by Clay Shelton MD on 12/10/2022 at 8:40 PM

## 2023-04-29 ENCOUNTER — NURSE TRIAGE (OUTPATIENT)
Dept: OTHER | Facility: CLINIC | Age: 63
End: 2023-04-29

## 2023-04-29 NOTE — TELEPHONE ENCOUNTER
Location of patient: [address] [place] [address] Dr Cassidy called regarding doppler study on LLE from 4/25 at [facility]. She received results today with convoluted wording. Results read \"Rule out DVT\" , Assessment read \"acute embolism and thrombosis LLE. \" Pt is unsure whether or not she is positive for DVT. This RN did not see anything in pt's chart to be able to help her decipher result. She mentioned that the [facility] does have on call providers. This RN advised pt to page on call provider for clarification of ultra sound results. Advised her to call back if she needs more assistance.

## 2023-11-11 ENCOUNTER — HOSPITAL ENCOUNTER (EMERGENCY)
Age: 63
Discharge: HOME | End: 2023-11-11
Attending: EMERGENCY MEDICINE
Payer: COMMERCIAL

## 2023-11-11 VITALS
RESPIRATION RATE: 12 BRPM | WEIGHT: 158 LBS | DIASTOLIC BLOOD PRESSURE: 75 MMHG | TEMPERATURE: 98.4 F | BODY MASS INDEX: 29.08 KG/M2 | HEIGHT: 62 IN | HEART RATE: 63 BPM | SYSTOLIC BLOOD PRESSURE: 156 MMHG | OXYGEN SATURATION: 99 %

## 2023-11-11 DIAGNOSIS — T78.40XA ALLERGIC REACTION, INITIAL ENCOUNTER: Primary | ICD-10-CM

## 2023-11-11 PROCEDURE — 6370000000 HC RX 637 (ALT 250 FOR IP): Performed by: NURSE PRACTITIONER

## 2023-11-11 PROCEDURE — 6360000002 HC RX W HCPCS: Performed by: NURSE PRACTITIONER

## 2023-11-11 PROCEDURE — 99284 EMERGENCY DEPT VISIT MOD MDM: CPT

## 2023-11-11 PROCEDURE — 96372 THER/PROPH/DIAG INJ SC/IM: CPT

## 2023-11-11 RX ORDER — HYDROXYZINE HYDROCHLORIDE 25 MG/1
25 TABLET, FILM COATED ORAL ONCE
Status: COMPLETED | OUTPATIENT
Start: 2023-11-11 | End: 2023-11-11

## 2023-11-11 RX ORDER — DEXAMETHASONE SODIUM PHOSPHATE 10 MG/ML
8 INJECTION, SOLUTION INTRAMUSCULAR; INTRAVENOUS ONCE
Status: COMPLETED | OUTPATIENT
Start: 2023-11-11 | End: 2023-11-11

## 2023-11-11 RX ORDER — HYDROXYZINE HYDROCHLORIDE 25 MG/1
50 TABLET, FILM COATED ORAL EVERY 6 HOURS PRN
Qty: 20 TABLET | Refills: 0 | Status: SHIPPED | OUTPATIENT
Start: 2023-11-11 | End: 2023-11-21

## 2023-11-11 RX ORDER — PREDNISONE 20 MG/1
40 TABLET ORAL DAILY
Qty: 10 TABLET | Refills: 0 | Status: SHIPPED | OUTPATIENT
Start: 2023-11-11 | End: 2023-11-16

## 2023-11-11 RX ADMIN — DEXAMETHASONE SODIUM PHOSPHATE 8 MG: 10 INJECTION, SOLUTION INTRAMUSCULAR; INTRAVENOUS at 19:21

## 2023-11-11 RX ADMIN — HYDROXYZINE HYDROCHLORIDE 25 MG: 25 TABLET, FILM COATED ORAL at 19:21

## 2023-11-11 ASSESSMENT — PAIN - FUNCTIONAL ASSESSMENT: PAIN_FUNCTIONAL_ASSESSMENT: NONE - DENIES PAIN

## 2023-11-11 ASSESSMENT — ENCOUNTER SYMPTOMS
TROUBLE SWALLOWING: 0
WHEEZING: 0
SHORTNESS OF BREATH: 0
FACIAL SWELLING: 0

## 2023-11-12 NOTE — DISCHARGE INSTRUCTIONS
Take medications as prescribed. Follow-up with your primary care provider on Monday. Return to emergency department for worsening or new symptoms.

## 2023-11-12 NOTE — ED NOTES
Pt observed for 15 minutes after med admin. Pt states she feels nauseous. Pt offered by RN to ask provider for antinausea meds. Pt declined. Pt with male visitor. Pt instructed on discharge instructions for follow up and given two paper prescriptions.       Patrick Almonte RN  11/11/23 1941

## 2023-11-15 NOTE — ED PROVIDER NOTES
eMERGENCY dEPARTMENT eNCOUnter   Independent Attestation     Pt Name: Armin Potter  MRN: [id number]  [address] [place] 1960  Date of evaluation: 11/15/23     Armin Potter is a 58 y.o. female with CC: Allergic Reaction (Patient is itching from her head to her feet. Started yesterday, itching a little bit, but today it is diffuse. Patient symptoms worsened both yesterday and today after taking her Metformin which she has been on for a while. )        This visit was performed by both a physician and an APC. I performed all aspects of the MDM as documented.       Luann Chamorro MD  Attending Emergency Physician            Luann Chamorro MD  11/15/23 7415
K31.84    Hepatic steatosis K76.0    Pancreas cyst K86.2    Type 2 diabetes mellitus without complication, without long-term current use of insulin (HCC) E11.9    Trochanteric bursitis of right hip M70.61    Radicular leg pain M54.10    Hip pain, right M25.551    Lumbar stenosis M48.061    Chronic midline low back pain without sciatica M54.50, G89.29    Trigger middle finger of right hand M65.331    Lumbar radiculopathy M54.16    Sacroiliac joint dysfunction of left side M53.3         DISPOSITION/PLAN   DISPOSITION Decision To Discharge 11/11/2023 07:11:50 PM      PATIENT REFERRED TO:   Luis Armando Arroyo MD  [address] [address] [phone]    In 2 days      [facility] ED  [address] [facility]  [phone]    If symptoms worsen      DISCHARGE MEDICATIONS:     Discharge Medication List as of 11/11/2023  7:25 PM        START taking these medications    Details   hydrOXYzine HCl (ATARAX) 25 MG tablet Take 2 tablets by mouth every 6 hours as needed for Itching, Disp-20 tablet, R-0Print      predniSONE (DELTASONE) 20 MG tablet Take 2 tablets by mouth daily for 5 days, Disp-10 tablet, R-0Print                 (Please note that portions of this note were completed with a voice recognition program.  Efforts were made to edit the dictations but occasionally words are mis-transcribed.)    HANSA Beebe CNP, APRN - CNP  11/11/23 [id number]

## 2025-01-07 ENCOUNTER — PREP FOR PROCEDURE (OUTPATIENT)
Age: 65
End: 2025-01-07

## 2025-01-07 ENCOUNTER — OFFICE VISIT (OUTPATIENT)
Age: 65
End: 2025-01-07
Payer: MEDICARE

## 2025-01-07 VITALS — BODY MASS INDEX: 29.44 KG/M2 | HEIGHT: 62 IN | WEIGHT: 160 LBS

## 2025-01-07 DIAGNOSIS — M65.331 TRIGGER MIDDLE FINGER OF RIGHT HAND: Primary | ICD-10-CM

## 2025-01-07 DIAGNOSIS — M65.331 ACQUIRED TRIGGER FINGER OF RIGHT MIDDLE FINGER: ICD-10-CM

## 2025-01-07 DIAGNOSIS — S62.647A CLOSED NONDISPLACED FRACTURE OF PROXIMAL PHALANX OF LEFT LITTLE FINGER, INITIAL ENCOUNTER: ICD-10-CM

## 2025-01-07 PROCEDURE — 99204 OFFICE O/P NEW MOD 45 MIN: CPT | Performed by: ORTHOPAEDIC SURGERY

## 2025-01-07 PROCEDURE — 1123F ACP DISCUSS/DSCN MKR DOCD: CPT | Performed by: ORTHOPAEDIC SURGERY

## 2025-01-07 RX ORDER — ALBUTEROL SULFATE 90 UG/1
AEROSOL, METERED RESPIRATORY (INHALATION)
COMMUNITY

## 2025-01-07 RX ORDER — DULAGLUTIDE 0.75 MG/.5ML
INJECTION, SOLUTION SUBCUTANEOUS
COMMUNITY
Start: 2025-01-04

## 2025-01-07 NOTE — PROGRESS NOTES
[facility]      [facility]  [facility]  [address]  [place] [address]  Dept: [phone]  Dept Fax: [phone]    Chief Compliant:  Chief Complaint   Patient presents with    Hand Pain     Right Middle Trigger Finger        History of Present Illness:  1/7/25:This is a pleasant 64 y.o. female who is here today regarding 2 issues.  She has been having triggering of her right middle finger for about 7 years now.  She had a cortisone injection in 2017.  This is getting worse and limits her daily activities.    As a second issue she had an injury about 2 and half months ago.  She went to open a door pushing on the handle and the left small finger got stuck placing what sounds a valgus force on the small finger MCP joint.  She had immediate pain.  This is continued to be painful over the last 2 and half months.  She notes particularly painful with adduction of the small finger.    Physical Exam: The right hand has tenderness to palpation to the middle finger A1 pulley.  There is some active triggering in the office today.    The left hand has tenderness to palpation to the base of the proximal phalanx of the small finger.  She has some pain with valgus stress of the MP joint.  She has full range of motion of the digit.  She has good abduction and adduction.    Imaging: X-rays of the left hand taken at the [facility] 2 sets both done in December were independently reviewed with her.  The first set shows no fracture.  The second set shows on 1 view the small fracture on the radial base of the small finger proximal phalanx.      Assessment and Plan:    This is a pleasant 64 y.o. female who has recalcitrant right middle finger flexor tenosynovitis.  I recommend a right middle finger A1 pulley release.  Risks benefits and alternatives of A1 pulley release surgery was discussed with the patient.

## 2025-02-28 ENCOUNTER — TELEPHONE (OUTPATIENT)
Age: 65
End: 2025-02-28

## 2025-02-28 DIAGNOSIS — Z01.818 PRE-OP EXAM: Primary | ICD-10-CM

## 2025-02-28 NOTE — TELEPHONE ENCOUNTER
Patient has been scheduled for sx on 3/21. Instructions were given at the time of booking.  I called today and spoke to him/her to confirm date/time/location of sx and reminded him/her to get PAT done.  We also scheduled the post op appointment.

## 2025-03-04 ENCOUNTER — HOSPITAL ENCOUNTER (OUTPATIENT)
Age: 65
Discharge: HOME | End: 2025-03-04
Payer: MEDICARE

## 2025-03-04 DIAGNOSIS — Z01.818 PRE-OP EXAM: ICD-10-CM

## 2025-03-04 LAB
ANION GAP SERPL CALCULATED.3IONS-SCNC: 8 MMOL/L (ref 9–16)
BASOPHILS # BLD: 0.03 K/UL (ref 0–0.2)
BASOPHILS NFR BLD: 1 % (ref 0–2)
BUN SERPL-MCNC: 12 MG/DL (ref 8–23)
CALCIUM SERPL-MCNC: 9.4 MG/DL (ref 8.6–10.4)
CHLORIDE SERPL-SCNC: 106 MMOL/L (ref 98–107)
CO2 SERPL-SCNC: 27 MMOL/L (ref 20–31)
CREAT SERPL-MCNC: 0.8 MG/DL (ref 0.6–0.9)
EOSINOPHIL # BLD: 0.2 K/UL (ref 0–0.44)
EOSINOPHILS RELATIVE PERCENT: 5 % (ref 1–4)
ERYTHROCYTE [DISTWIDTH] IN BLOOD BY AUTOMATED COUNT: 13.5 % (ref 11.8–14.4)
GFR, ESTIMATED: 82 ML/MIN/1.73M2
GLUCOSE SERPL-MCNC: 253 MG/DL (ref 74–99)
HCT VFR BLD AUTO: 39.6 % (ref 36.3–47.1)
HGB BLD-MCNC: 12.6 G/DL (ref 11.9–15.1)
IMM GRANULOCYTES # BLD AUTO: <0.03 K/UL (ref 0–0.3)
IMM GRANULOCYTES NFR BLD: 0 %
LYMPHOCYTES NFR BLD: 1.67 K/UL (ref 1.1–3.7)
LYMPHOCYTES RELATIVE PERCENT: 38 % (ref 24–43)
MCH RBC QN AUTO: 29 PG (ref 25.2–33.5)
MCHC RBC AUTO-ENTMCNC: 31.8 G/DL (ref 28.4–34.8)
MCV RBC AUTO: 91 FL (ref 82.6–102.9)
MONOCYTES NFR BLD: 0.31 K/UL (ref 0.1–1.2)
MONOCYTES NFR BLD: 7 % (ref 3–12)
NEUTROPHILS NFR BLD: 49 % (ref 36–65)
NEUTS SEG NFR BLD: 2.21 K/UL (ref 1.5–8.1)
NRBC BLD-RTO: 0 PER 100 WBC
PLATELET # BLD AUTO: 125 K/UL (ref 138–453)
PMV BLD AUTO: 10.2 FL (ref 8.1–13.5)
POTASSIUM SERPL-SCNC: 3.9 MMOL/L (ref 3.7–5.3)
RBC # BLD AUTO: 4.35 M/UL (ref 3.95–5.11)
SODIUM SERPL-SCNC: 141 MMOL/L (ref 136–145)
WBC OTHER # BLD: 4.4 K/UL (ref 3.5–11.3)

## 2025-03-04 PROCEDURE — 36415 COLL VENOUS BLD VENIPUNCTURE: CPT

## 2025-03-04 PROCEDURE — 85025 COMPLETE CBC W/AUTO DIFF WBC: CPT

## 2025-03-04 PROCEDURE — 80048 BASIC METABOLIC PNL TOTAL CA: CPT

## 2025-03-09 LAB
EKG ATRIAL RATE: 68 BPM
EKG P AXIS: 62 DEGREES
EKG P-R INTERVAL: 168 MS
EKG Q-T INTERVAL: 418 MS
EKG QRS DURATION: 78 MS
EKG QTC CALCULATION (BAZETT): 444 MS
EKG R AXIS: 80 DEGREES
EKG T AXIS: 72 DEGREES
EKG VENTRICULAR RATE: 68 BPM

## 2025-03-13 RX ORDER — IBUPROFEN 400 MG/1
400 TABLET, FILM COATED ORAL EVERY 6 HOURS PRN
Status: ON HOLD | COMMUNITY
End: 2025-03-21 | Stop reason: HOSPADM

## 2025-03-13 RX ORDER — INSULIN GLARGINE 100 [IU]/ML
15 INJECTION, SOLUTION SUBCUTANEOUS EVERY MORNING
COMMUNITY

## 2025-03-13 RX ORDER — DULAGLUTIDE 1.5 MG/.5ML
1.5 INJECTION, SOLUTION SUBCUTANEOUS WEEKLY
COMMUNITY
Start: 2025-02-23 | End: 2025-03-13

## 2025-03-13 RX ORDER — ETODOLAC 400 MG/1
400 TABLET, FILM COATED ORAL PRN
COMMUNITY

## 2025-03-19 ENCOUNTER — ANESTHESIA EVENT (OUTPATIENT)
Dept: OPERATING ROOM | Age: 65
End: 2025-03-19
Payer: MEDICARE

## 2025-03-20 RX ORDER — SODIUM CHLORIDE 0.9 % (FLUSH) 0.9 %
5-40 SYRINGE (ML) INJECTION EVERY 12 HOURS SCHEDULED
Status: CANCELLED | OUTPATIENT
Start: 2025-03-20

## 2025-03-20 RX ORDER — DEXAMETHASONE SODIUM PHOSPHATE 10 MG/ML
10 INJECTION, SOLUTION INTRAMUSCULAR; INTRAVENOUS ONCE
Status: CANCELLED | OUTPATIENT
Start: 2025-03-20 | End: 2025-03-20

## 2025-03-20 RX ORDER — ACETAMINOPHEN 500 MG
1000 TABLET ORAL ONCE
Status: CANCELLED | OUTPATIENT
Start: 2025-03-20 | End: 2025-03-20

## 2025-03-20 RX ORDER — SODIUM CHLORIDE 0.9 % (FLUSH) 0.9 %
5-40 SYRINGE (ML) INJECTION PRN
Status: CANCELLED | OUTPATIENT
Start: 2025-03-20

## 2025-03-21 ENCOUNTER — HOSPITAL ENCOUNTER (OUTPATIENT)
Age: 65
Setting detail: OUTPATIENT SURGERY
Discharge: HOME | End: 2025-03-21
Attending: ORTHOPAEDIC SURGERY | Admitting: ORTHOPAEDIC SURGERY
Payer: MEDICARE

## 2025-03-21 ENCOUNTER — ANESTHESIA (OUTPATIENT)
Dept: OPERATING ROOM | Age: 65
End: 2025-03-21
Payer: MEDICARE

## 2025-03-21 VITALS
OXYGEN SATURATION: 99 % | SYSTOLIC BLOOD PRESSURE: 120 MMHG | RESPIRATION RATE: 17 BRPM | BODY MASS INDEX: 29.44 KG/M2 | DIASTOLIC BLOOD PRESSURE: 56 MMHG | WEIGHT: 160 LBS | HEART RATE: 56 BPM | HEIGHT: 62 IN | TEMPERATURE: 97.8 F

## 2025-03-21 LAB — GLUCOSE BLD-MCNC: 200 MG/DL (ref 65–105)

## 2025-03-21 PROCEDURE — 7100000010 HC PHASE II RECOVERY - FIRST 15 MIN: Performed by: ORTHOPAEDIC SURGERY

## 2025-03-21 PROCEDURE — 3600000002 HC SURGERY LEVEL 2 BASE: Performed by: ORTHOPAEDIC SURGERY

## 2025-03-21 PROCEDURE — 3700000000 HC ANESTHESIA ATTENDED CARE: Performed by: ORTHOPAEDIC SURGERY

## 2025-03-21 PROCEDURE — 3600000012 HC SURGERY LEVEL 2 ADDTL 15MIN: Performed by: ORTHOPAEDIC SURGERY

## 2025-03-21 PROCEDURE — 3700000001 HC ADD 15 MINUTES (ANESTHESIA): Performed by: ORTHOPAEDIC SURGERY

## 2025-03-21 PROCEDURE — 7100000011 HC PHASE II RECOVERY - ADDTL 15 MIN: Performed by: ORTHOPAEDIC SURGERY

## 2025-03-21 PROCEDURE — 2580000003 HC RX 258: Performed by: ANESTHESIOLOGY

## 2025-03-21 PROCEDURE — 6360000002 HC RX W HCPCS: Performed by: ORTHOPAEDIC SURGERY

## 2025-03-21 PROCEDURE — 2709999900 HC NON-CHARGEABLE SUPPLY: Performed by: ORTHOPAEDIC SURGERY

## 2025-03-21 PROCEDURE — 6360000002 HC RX W HCPCS: Performed by: NURSE ANESTHETIST, CERTIFIED REGISTERED

## 2025-03-21 PROCEDURE — 82947 ASSAY GLUCOSE BLOOD QUANT: CPT

## 2025-03-21 RX ORDER — SODIUM CHLORIDE 0.9 % (FLUSH) 0.9 %
5-40 SYRINGE (ML) INJECTION PRN
Status: DISCONTINUED | OUTPATIENT
Start: 2025-03-21 | End: 2025-03-21 | Stop reason: HOSPADM

## 2025-03-21 RX ORDER — FENTANYL CITRATE 50 UG/ML
INJECTION, SOLUTION INTRAMUSCULAR; INTRAVENOUS
Status: DISCONTINUED | OUTPATIENT
Start: 2025-03-21 | End: 2025-03-21 | Stop reason: SDUPTHER

## 2025-03-21 RX ORDER — LABETALOL HYDROCHLORIDE 5 MG/ML
10 INJECTION, SOLUTION INTRAVENOUS
Status: DISCONTINUED | OUTPATIENT
Start: 2025-03-21 | End: 2025-03-21 | Stop reason: HOSPADM

## 2025-03-21 RX ORDER — SODIUM CHLORIDE 9 MG/ML
INJECTION, SOLUTION INTRAVENOUS CONTINUOUS
Status: DISCONTINUED | OUTPATIENT
Start: 2025-03-21 | End: 2025-03-21 | Stop reason: HOSPADM

## 2025-03-21 RX ORDER — PROPOFOL 10 MG/ML
INJECTION, EMULSION INTRAVENOUS
Status: DISCONTINUED | OUTPATIENT
Start: 2025-03-21 | End: 2025-03-21 | Stop reason: SDUPTHER

## 2025-03-21 RX ORDER — ONDANSETRON 2 MG/ML
INJECTION INTRAMUSCULAR; INTRAVENOUS
Status: DISCONTINUED | OUTPATIENT
Start: 2025-03-21 | End: 2025-03-21 | Stop reason: SDUPTHER

## 2025-03-21 RX ORDER — MIDAZOLAM HYDROCHLORIDE 1 MG/ML
INJECTION, SOLUTION INTRAMUSCULAR; INTRAVENOUS
Status: DISCONTINUED | OUTPATIENT
Start: 2025-03-21 | End: 2025-03-21 | Stop reason: SDUPTHER

## 2025-03-21 RX ORDER — METOCLOPRAMIDE HYDROCHLORIDE 5 MG/ML
10 INJECTION INTRAMUSCULAR; INTRAVENOUS
Status: DISCONTINUED | OUTPATIENT
Start: 2025-03-21 | End: 2025-03-21 | Stop reason: HOSPADM

## 2025-03-21 RX ORDER — SODIUM CHLORIDE 0.9 % (FLUSH) 0.9 %
5-40 SYRINGE (ML) INJECTION EVERY 12 HOURS SCHEDULED
Status: DISCONTINUED | OUTPATIENT
Start: 2025-03-21 | End: 2025-03-21 | Stop reason: HOSPADM

## 2025-03-21 RX ORDER — NALOXONE HYDROCHLORIDE 0.4 MG/ML
INJECTION, SOLUTION INTRAMUSCULAR; INTRAVENOUS; SUBCUTANEOUS PRN
Status: DISCONTINUED | OUTPATIENT
Start: 2025-03-21 | End: 2025-03-21 | Stop reason: HOSPADM

## 2025-03-21 RX ORDER — MORPHINE SULFATE 2 MG/ML
1 INJECTION, SOLUTION INTRAMUSCULAR; INTRAVENOUS EVERY 5 MIN PRN
Status: DISCONTINUED | OUTPATIENT
Start: 2025-03-21 | End: 2025-03-21 | Stop reason: HOSPADM

## 2025-03-21 RX ORDER — HYDRALAZINE HYDROCHLORIDE 20 MG/ML
10 INJECTION INTRAMUSCULAR; INTRAVENOUS
Status: DISCONTINUED | OUTPATIENT
Start: 2025-03-21 | End: 2025-03-21 | Stop reason: HOSPADM

## 2025-03-21 RX ORDER — SODIUM CHLORIDE 9 MG/ML
INJECTION, SOLUTION INTRAVENOUS PRN
Status: DISCONTINUED | OUTPATIENT
Start: 2025-03-21 | End: 2025-03-21 | Stop reason: HOSPADM

## 2025-03-21 RX ORDER — MIDAZOLAM HYDROCHLORIDE 2 MG/2ML
2 INJECTION, SOLUTION INTRAMUSCULAR; INTRAVENOUS
Status: DISCONTINUED | OUTPATIENT
Start: 2025-03-21 | End: 2025-03-21 | Stop reason: HOSPADM

## 2025-03-21 RX ORDER — ROPIVACAINE HYDROCHLORIDE 5 MG/ML
INJECTION, SOLUTION EPIDURAL; INFILTRATION; PERINEURAL PRN
Status: DISCONTINUED | OUTPATIENT
Start: 2025-03-21 | End: 2025-03-21 | Stop reason: ALTCHOICE

## 2025-03-21 RX ORDER — MEPERIDINE HYDROCHLORIDE 50 MG/ML
12.5 INJECTION INTRAMUSCULAR; INTRAVENOUS; SUBCUTANEOUS ONCE
Status: DISCONTINUED | OUTPATIENT
Start: 2025-03-21 | End: 2025-03-21 | Stop reason: HOSPADM

## 2025-03-21 RX ORDER — ONDANSETRON 2 MG/ML
4 INJECTION INTRAMUSCULAR; INTRAVENOUS
Status: DISCONTINUED | OUTPATIENT
Start: 2025-03-21 | End: 2025-03-21 | Stop reason: HOSPADM

## 2025-03-21 RX ORDER — IBUPROFEN 800 MG/1
800 TABLET, FILM COATED ORAL
Qty: 30 TABLET | Refills: 0 | Status: SHIPPED | OUTPATIENT
Start: 2025-03-21 | End: 2025-03-28

## 2025-03-21 RX ORDER — ACETAMINOPHEN 500 MG
1000 TABLET ORAL
Qty: 60 TABLET | Refills: 0 | Status: SHIPPED | OUTPATIENT
Start: 2025-03-21 | End: 2025-03-28

## 2025-03-21 RX ORDER — OXYCODONE HYDROCHLORIDE 5 MG/1
5 TABLET ORAL PRN
Status: DISCONTINUED | OUTPATIENT
Start: 2025-03-21 | End: 2025-03-21 | Stop reason: HOSPADM

## 2025-03-21 RX ORDER — KETOROLAC TROMETHAMINE 30 MG/ML
INJECTION, SOLUTION INTRAMUSCULAR; INTRAVENOUS
Status: DISCONTINUED | OUTPATIENT
Start: 2025-03-21 | End: 2025-03-21 | Stop reason: SDUPTHER

## 2025-03-21 RX ORDER — SODIUM CHLORIDE, SODIUM LACTATE, POTASSIUM CHLORIDE, CALCIUM CHLORIDE 600; 310; 30; 20 MG/100ML; MG/100ML; MG/100ML; MG/100ML
INJECTION, SOLUTION INTRAVENOUS CONTINUOUS
Status: DISCONTINUED | OUTPATIENT
Start: 2025-03-21 | End: 2025-03-21 | Stop reason: HOSPADM

## 2025-03-21 RX ORDER — OXYCODONE HYDROCHLORIDE 5 MG/1
10 TABLET ORAL PRN
Status: DISCONTINUED | OUTPATIENT
Start: 2025-03-21 | End: 2025-03-21 | Stop reason: HOSPADM

## 2025-03-21 RX ORDER — DIPHENHYDRAMINE HYDROCHLORIDE 50 MG/ML
12.5 INJECTION, SOLUTION INTRAMUSCULAR; INTRAVENOUS
Status: DISCONTINUED | OUTPATIENT
Start: 2025-03-21 | End: 2025-03-21 | Stop reason: HOSPADM

## 2025-03-21 RX ORDER — LIDOCAINE HYDROCHLORIDE 10 MG/ML
INJECTION, SOLUTION EPIDURAL; INFILTRATION; INTRACAUDAL; PERINEURAL
Status: DISCONTINUED | OUTPATIENT
Start: 2025-03-21 | End: 2025-03-21 | Stop reason: SDUPTHER

## 2025-03-21 RX ADMIN — ONDANSETRON 4 MG: 2 INJECTION, SOLUTION INTRAMUSCULAR; INTRAVENOUS at 12:24

## 2025-03-21 RX ADMIN — MIDAZOLAM 2 MG: 1 INJECTION INTRAMUSCULAR; INTRAVENOUS at 12:00

## 2025-03-21 RX ADMIN — PROPOFOL 50 MG: 10 INJECTION, EMULSION INTRAVENOUS at 12:11

## 2025-03-21 RX ADMIN — SODIUM CHLORIDE: 9 INJECTION, SOLUTION INTRAVENOUS at 12:03

## 2025-03-21 RX ADMIN — PROPOFOL 75 MCG/KG/MIN: 10 INJECTION, EMULSION INTRAVENOUS at 12:12

## 2025-03-21 RX ADMIN — KETOROLAC TROMETHAMINE 30 MG: 30 INJECTION, SOLUTION INTRAMUSCULAR at 12:24

## 2025-03-21 RX ADMIN — FENTANYL CITRATE 100 MCG: 50 INJECTION, SOLUTION INTRAMUSCULAR; INTRAVENOUS at 12:00

## 2025-03-21 RX ADMIN — LIDOCAINE HYDROCHLORIDE 50 MG: 10 INJECTION, SOLUTION EPIDURAL; INFILTRATION; INTRACAUDAL; PERINEURAL at 12:11

## 2025-03-21 ASSESSMENT — PAIN - FUNCTIONAL ASSESSMENT
PAIN_FUNCTIONAL_ASSESSMENT: FACE, LEGS, ACTIVITY, CRY, AND CONSOLABILITY (FLACC)
PAIN_FUNCTIONAL_ASSESSMENT: 0-10

## 2025-03-21 NOTE — ANESTHESIA PRE PROCEDURE
Department of Anesthesiology  Preprocedure Note       Name:  Roberta Yuan   Age:  64 y.o.  :  1960                                          MRN:  [id number]         Date:  3/21/2025      Surgeon: Surgeon(s):  Dominick Newman MD    Procedure: Procedure(s):  RIGHT MIDDLE FINGER TRIGGER RELEASE/TENOSYNOVECTOMY    Medications prior to admission:   Prior to Admission medications    Medication Sig Start Date End Date Taking? Authorizing Provider   insulin glargine (LANTUS) 100 UNIT/ML injection vial Inject 15 Units into the skin every morning   Yes Fabien Sellers MD   ibuprofen (ADVIL;MOTRIN) 400 MG tablet Take 1 tablet by mouth every 6 hours as needed for Pain   Yes Fabien Sellers MD   etodolac (LODINE) 400 MG tablet Take 1 tablet by mouth as needed   Yes Fabien Sellers MD   PROAIR  (90 Base) MCG/ACT inhaler    Yes Fabien Sellers MD   ALPRAZolam (XANAX) 0.5 MG tablet Take 1 tablet by mouth 3 times daily as needed for Anxiety. 21  Yes Fabien Sellers MD   famotidine (PEPCID) 40 MG tablet Take 1 tablet by mouth nightly as needed 22  Yes Fabien Sellers MD   pantoprazole (PROTONIX) 40 MG tablet Take 1 tablet by mouth every morning 22  Yes Fabien Sellers MD   zolpidem (AMBIEN) 10 MG tablet Take 0.5 tablets by mouth nightly as needed for Sleep.   Yes Fabien Sellers MD   TRULICITY 0.75 MG/0.5ML SOAJ SC injection  25   Fabien Sellers MD   fluticasone (FLONASE) 50 MCG/ACT nasal spray 1 spray by Nasal route 2 times daily for 14 days 3/2/18 12/9/22  Antony Slater MD   ondansetron (ZOFRAN-ODT) 4 MG disintegrating tablet DISSOLVE 1 TABLET IN MOUTH EVERY SIX HOURS AS NEEDED 17   Fabien Sellers MD   Blood Glucose Monitoring Suppl (ONE TOUCH ULTRA 2) W/DEVICE KIT USE TO TEST BLOOD SUGAR DAILY AS DIRECTED 17   Fabien Sellers MD   ONE TOUCH ULTRA TEST strip USE TO TEST TWICE DAILY AS DIRECTED 17   Marlo

## 2025-03-21 NOTE — ANESTHESIA POSTPROCEDURE EVALUATION
Department of Anesthesiology  Postprocedure Note    Patient: Roberta Yuan  MRN: [id number]  YOB: 1960  Date of evaluation: 3/21/2025    Procedure Summary       Date: 03/21/25 Room / Location: [address]    Anesthesia Start: 1203 Anesthesia Stop: 1231    Procedure: RIGHT MIDDLE FINGER TRIGGER RELEASE/TENOSYNOVECTOMY (Right: Hand) Diagnosis:       Acquired trigger finger of right middle finger      (Acquired trigger finger of right middle finger [M65.331])    Surgeons: Dominick Newman MD Responsible Provider: Carla Guzmán MD    Anesthesia Type: MAC ASA Status: 2            Anesthesia Type: No value filed.    Jesse Phase I: Jesse Score: 10    Jesse Phase II: Jesse Score: 9    Anesthesia Post Evaluation    Patient location during evaluation: PACU  Patient participation: complete - patient participated  Level of consciousness: awake and alert  Airway patency: patent  Nausea & Vomiting: no nausea and no vomiting  Cardiovascular status: blood pressure returned to baseline  Respiratory status: acceptable and room air  Hydration status: euvolemic  Pain management: adequate and satisfactory to patient    No notable events documented.

## 2025-03-21 NOTE — H&P
[chlorpromazine], amoxicillin, augmentin [amoxicillin-pot clavulanate], bactrim [sulfamethoxazole-trimethoprim], ciprofloxacin, keflex [cephalexin], pcn [penicillins], and topamax [topiramate].    Social History  Roberta  reports that she has never smoked. She has never used smokeless tobacco. She reports that she does not drink alcohol and does not use drugs.    Family History  Roberta's family history includes COPD in her mother; Cancer in her sister; Heart Disease in her father; Other in her maternal uncle, mother, and sister; Stroke in her brother and mother.      Review of Systems   History obtained from the patient.   REVIEW OF SYSTEMS:   Constitution: negative for fever, chills    Physical Exam  /66   Pulse 62   Temp 98.2 °F (36.8 °C) (Infrared)   Resp 16   Ht 1.575 m (5' 2\")   Wt 72.6 kg (160 lb)   SpO2 100%   BMI 29.26 kg/m²    General Appearance: in no distress  HEENT: Normocephalic  CV: brisk cap refill to extremities  Lungs: Nonlabored breathing  Abdomen: soft  Extremities: Right middle finger skin intact    Assessment and Plan  Roberta Yuan is a 64 y.o. old female who has right middle finger flexor tenosynovitis.  Plan is for right middle finger A1 pulley release.    This note is created with the assistance of a speech recognition program.  While intending to generate a document that actually reflects the content of the visit, the document can still have some errors including those of syntax and sound a like substitutions which may escape proof reading.  It such instances, actual meaning can be extrapolated by contextual diversion.

## 2025-03-26 NOTE — OP NOTE
Operative Note      Patient: Roberta Yuan  YOB: 1960  MRN: [id number]    Date of Procedure: 3/21/2025    Pre-Op Diagnosis Codes:      * Acquired trigger finger of right middle finger [M65.331]    Post-Op Diagnosis: Same       Procedure(s):  RIGHT MIDDLE FINGER TRIGGER RELEASE/TENOSYNOVECTOMY    Surgeon(s):  Dominick Newman MD    Assistant:   Resident: Antony Hendrix DO    Anesthesia: Monitor Anesthesia Care    Estimated Blood Loss (mL): Minimal    Complications: None    Specimens:   * No specimens in log *    Implants:  * No implants in log *      Drains: * No LDAs found *    Findings:  Infection Present At Time Of Surgery (PATOS) (choose all levels that have infection present):  No infection present  Other Findings:     Detailed Description of Procedure:   Informed consent was obtained.  I marked the right middle finger . The patient was brought back to the operating room where monitored sedation was begun.  The appropriate timeout was performed.  No antibiotics were given as none were indicated.  I cleansed the operative site.  I anesthetized the surgical site with half percent ropivacaine plain.  The hand was prepped and draped in normal sterile fashion.  I made an incision over the right middle finger A1 pulley.  I bluntly dissected through the subcutaneous tissue.  I used a knife and scissors to release the A1 pulley proximally and distally.  I protected the neurovascular bundles.  I pulled the flexor tendon out of the wound and there were no adhesions.  The skin was closed with Monocryl and skin glue.  A sterile dry dressing was applied.  Sponge and needle counts were correct.    Postoperative plan: Gentle use of the hand is okay immediately, avoid heavy .  Tylenol and ibuprofen for pain control.  I will see them in 2 weeks.    The orthopedic resident was a first assistant for this case.  I either performed the entire procedure or was scrubbed in an actively involved for all key and

## 2025-03-28 ENCOUNTER — APPOINTMENT (OUTPATIENT)
Dept: GENERAL RADIOLOGY | Age: 65
End: 2025-03-28
Payer: MEDICARE

## 2025-03-28 ENCOUNTER — HOSPITAL ENCOUNTER (EMERGENCY)
Age: 65
Discharge: HOME | End: 2025-03-28
Attending: EMERGENCY MEDICINE
Payer: MEDICARE

## 2025-03-28 VITALS
BODY MASS INDEX: 28.89 KG/M2 | RESPIRATION RATE: 19 BRPM | HEIGHT: 62 IN | TEMPERATURE: 98.4 F | SYSTOLIC BLOOD PRESSURE: 150 MMHG | WEIGHT: 157 LBS | OXYGEN SATURATION: 94 % | DIASTOLIC BLOOD PRESSURE: 76 MMHG | HEART RATE: 75 BPM

## 2025-03-28 DIAGNOSIS — S82.892A AVULSION FRACTURE OF ANKLE, LEFT, CLOSED, INITIAL ENCOUNTER: Primary | ICD-10-CM

## 2025-03-28 PROCEDURE — 6370000000 HC RX 637 (ALT 250 FOR IP): Performed by: EMERGENCY MEDICINE

## 2025-03-28 PROCEDURE — 96372 THER/PROPH/DIAG INJ SC/IM: CPT

## 2025-03-28 PROCEDURE — 6360000002 HC RX W HCPCS: Performed by: EMERGENCY MEDICINE

## 2025-03-28 PROCEDURE — 73610 X-RAY EXAM OF ANKLE: CPT

## 2025-03-28 PROCEDURE — 99284 EMERGENCY DEPT VISIT MOD MDM: CPT

## 2025-03-28 RX ORDER — KETOROLAC TROMETHAMINE 15 MG/ML
15 INJECTION, SOLUTION INTRAMUSCULAR; INTRAVENOUS ONCE
Status: COMPLETED | OUTPATIENT
Start: 2025-03-28 | End: 2025-03-28

## 2025-03-28 RX ORDER — ACETAMINOPHEN 500 MG
1000 TABLET ORAL
Qty: 60 TABLET | Refills: 0 | Status: SHIPPED | OUTPATIENT
Start: 2025-03-28 | End: 2025-04-07

## 2025-03-28 RX ORDER — IBUPROFEN 800 MG/1
800 TABLET, FILM COATED ORAL
Qty: 30 TABLET | Refills: 0 | Status: SHIPPED | OUTPATIENT
Start: 2025-03-28 | End: 2025-04-07

## 2025-03-28 RX ORDER — GINSENG 100 MG
CAPSULE ORAL ONCE
Status: COMPLETED | OUTPATIENT
Start: 2025-03-28 | End: 2025-03-28

## 2025-03-28 RX ADMIN — KETOROLAC TROMETHAMINE 15 MG: 15 INJECTION, SOLUTION INTRAMUSCULAR; INTRAVENOUS at 18:48

## 2025-03-28 RX ADMIN — BACITRACIN: 500 OINTMENT TOPICAL at 18:49

## 2025-03-28 ASSESSMENT — PAIN SCALES - GENERAL: PAINLEVEL_OUTOF10: 7

## 2025-03-28 NOTE — DISCHARGE INSTRUCTIONS
You may take ibuprofen or Tylenol as needed for pain.  Apply ice to minimize swelling.  Elevate your affected extremity.  Wear Aircast until you are evaluated by orthopedic surgeon.  Return back to the emergency department immediately if you notice any concerning or worsening signs or symptoms.

## 2025-03-28 NOTE — ED PROVIDER NOTES
EMERGENCY DEPARTMENT ENCOUNTER    Pt Name: Roberta Yuan  MRN: [id number]  Birthdate 1960  Date of evaluation: 3/28/25  CHIEF COMPLAINT       Chief Complaint   Patient presents with    Fall    Ankle Pain     Left     HISTORY OF PRESENT ILLNESS   64-year-old female presenting to the emergency department today with a chief complaint of left ankle injury.  Reports that she tripped and twisted her ankle earlier today.  Reports unable to bear weight secondary to pain.  Did not sustain trauma or injuries to any other body parts.  Denies any other systemic signs or symptoms.             REVIEW OF SYSTEMS     Review of Systems   Musculoskeletal:         Left ankle pain and swelling   All other systems reviewed and are negative.    PASTMEDICAL HISTORY     Past Medical History:   Diagnosis Date    Asthma     COPD (chronic obstructive pulmonary disease) (HCC)     COVID 11/2021    CTS (carpal tunnel syndrome)     Diabetes mellitus (HCC)     Endometrial disorder     Fibromyalgia     Gastroesophageal reflux disease without esophagitis 10/18/2016    Gastroparesis     GERD (gastroesophageal reflux disease)     Hepatic steatosis     History of blood transfusion 1980    Child birth    Hx of blood clots     superficial blood clot leg     Hyperglycemia     Inguinal hernia     Migraine     Neurocardiogenic syncope     Pancreas cyst     Pituitary adenoma (HCC) 11/14/2016    PONV (postoperative nausea and vomiting)     PTSD (post-traumatic stress disorder)     RAD (reactive airway disease)     Stroke (HCC) 1982    right side numbness speech difficulty resolved Dr. Marroquin 15yrs ago      Past Problem List  Patient Active Problem List   Diagnosis Code    Carpal tunnel syndrome of right wrist G56.01    Gastroesophageal reflux disease without esophagitis K21.9    Post menopausal syndrome N95.1    Gastroparesis K31.84    Hepatic steatosis K76.0    Pancreas cyst K86.2    Type 2 diabetes mellitus without complication, without long-term current  radiologist, see reports below, unless otherwise noted in MDM or here.  Reports below are reviewed by myself.  XR ANKLE LEFT (MIN 3 VIEWS)   Final Result   Lateral malleolar avulsion injury.             LABS: Lab orders shown below, the results are reviewed by myself, and all abnormals are listed below.  Labs Reviewed - No data to display    Vitals Reviewed:    Vitals:    03/28/25 1805   BP: (!) 142/63   Pulse: 77   Resp: 18   Temp: 98.4 °F (36.9 °C)   TempSrc: Tympanic   SpO2: 98%   Weight: 71.2 kg (157 lb)   Height: 1.575 m (5' 2\")     MEDICATIONS GIVEN TO PATIENT THIS ENCOUNTER:  Orders Placed This Encounter   Medications    ketorolac (TORADOL) injection 15 mg    bacitracin ointment    acetaminophen (TYLENOL) 500 MG tablet     Sig: Take 2 tablets by mouth every 6-8 hours as needed for Pain     Dispense:  60 tablet     Refill:  0    ibuprofen (ADVIL;MOTRIN) 800 MG tablet     Sig: Take 1 tablet by mouth 3 times daily (before meals) for 10 days     Dispense:  30 tablet     Refill:  0     DISCHARGE PRESCRIPTIONS:  Current Discharge Medication List        PHYSICIAN CONSULTS ORDERED THIS ENCOUNTER:  None  FINAL IMPRESSION      1. Avulsion fracture of ankle, left, closed, initial encounter          DISPOSITION/PLAN   DISPOSITION   Discharge  DISPOSITION CONDITION Stable           OUTPATIENT FOLLOW UP THE PATIENT:  [place]  [address] [address] [facility] [address] [phone]    Call       [facility] Emergency Department  [address] [address]  [phone]  Go to   If symptoms worsen    Ryan Harrison MD  [facility] [facility]  [facility] [id number] [phone]    Schedule an appointment as soon as possible for a visit   To get evaluated for ankle fracture      Maylin Gomez MD        This note was created with the assistance of a speech-recognition program. While intending to generate a document that actually reflects the content of the visit, no guarantees can be provided

## 2025-03-28 NOTE — ED NOTES
Pt presents to the ED c/o left ankle pain.  Pt states that she was walking when she tripped on uneven sidewalk and rolled her left ankle, falling forward. Pt denies hitting head or loss of consciousness at time of fall.  Pt states that a couple of hours later she noted continued pain and worsened swelling to the left ankle prompting her to come to the ED.  Pt also reports that she had a trigger finger release on the right hand recently.  Pt states that when she fell forward she fell onto the right hand and had bleeding.

## 2025-03-29 NOTE — ED NOTES
Pt provided discharge paperwork and educated on prescriptions.  Pt instructed to follow up with PCP and ortho and told to return to ED with any new / worsened symptoms.  Pt verbalizes understanding and denies additional questions or concerns at this time.  Pt assisted out of ED via wheelchair

## 2025-04-01 ENCOUNTER — OFFICE VISIT (OUTPATIENT)
Age: 65
End: 2025-04-01
Payer: MEDICARE

## 2025-04-01 VITALS — BODY MASS INDEX: 28.89 KG/M2 | HEIGHT: 62 IN | WEIGHT: 157 LBS

## 2025-04-01 DIAGNOSIS — S82.892A CLOSED FRACTURE OF LEFT ANKLE, INITIAL ENCOUNTER: Primary | ICD-10-CM

## 2025-04-01 PROCEDURE — 1123F ACP DISCUSS/DSCN MKR DOCD: CPT | Performed by: NURSE PRACTITIONER

## 2025-04-01 PROCEDURE — 99213 OFFICE O/P EST LOW 20 MIN: CPT | Performed by: NURSE PRACTITIONER

## 2025-04-01 NOTE — PROGRESS NOTES
[facility] Orthopedics & Sports Medicine      [facility] PHYSICIANS [facility] ORTHOPAEDICS AND SPORTS MEDICINE  [address] [place] [address] [address]  [place] [address]  Dept: [phone]  Dept Fax: [phone]    Chief Compliant:  Chief Complaint   Patient presents with    Fracture     Left ankle        History of Present Illness:  4/1/25:This is a pleasant 64 y.o. female who is here for evaluation of a left ankle fracture. States she fell forward from a curb on Friday, twisting the left ankle and landing onto outstretched hands. She noted immediate swelling to the lateral ankle but was able to weightbear. She went to the ED and was placed in an aircast. She notes she has a high pain tolerance so her pain has been tolerable. She is taking Tylenol and Motrin as needed. She is also 2 weeks s/p right middle finger A1 pulley release, states she fell right onto her incision and it opened up and was bleeding. She states pain has been tolerable but she was concerned about her incision.      Physical Exam: Left ankle: Skin intact. Swelling about the lateral malleolus. Healing ecchymosis about the hind food. TTP over the medial and lateral malleoli. Limited ankle range of motion. Ambulates with antalgic gait.     Right hand: Skin intact. Surgical incision with some mild delayed healing but no deep dehiscence, mild erythema. No significant TTP over the incision. Good range of motion of the middle finger without any triggering.     Imaging: Independently reviewed xrays of the left ankle obtained on 3/28/25 which shows a minimally displaced avulsion fracture of the distal fibula. There are also small bony fragments about the medial malleolus that do not appear to be acute fractures.       Assessment and Plan:    This is a pleasant 64 y.o. female who is 2 weeks status post right middle finger A1 pulley release and also has an acute left ankle distal fibula avulsion fracture.     In terms of her right

## 2025-04-03 ENCOUNTER — TELEPHONE (OUTPATIENT)
Age: 65
End: 2025-04-03

## 2025-04-03 ENCOUNTER — TRANSCRIBE ORDERS (OUTPATIENT)
Dept: ADMINISTRATIVE | Age: 65
End: 2025-04-03

## 2025-04-03 ENCOUNTER — HOSPITAL ENCOUNTER (OUTPATIENT)
Dept: VASCULAR LAB | Age: 65
Discharge: HOME | End: 2025-04-05
Payer: MEDICARE

## 2025-04-03 DIAGNOSIS — M79.662 PAIN IN LEFT LOWER LEG: ICD-10-CM

## 2025-04-03 DIAGNOSIS — M79.662 PAIN IN LEFT LOWER LEG: Primary | ICD-10-CM

## 2025-04-03 PROCEDURE — 93971 EXTREMITY STUDY: CPT

## 2025-04-03 NOTE — TELEPHONE ENCOUNTER
Patient called stating that she is having left leg calf tenderness. Patient does have a history of DVT. Patient given a return call and is on her way to Dr. Faust's office. Patient will update the office after Dr. Faust sees the patient

## 2025-04-03 NOTE — TELEPHONE ENCOUNTER
Dr. Faust did have patient get a doppler study completed. Patient was seen at [facility].

## 2025-04-24 ENCOUNTER — OFFICE VISIT (OUTPATIENT)
Age: 65
End: 2025-04-24
Payer: MEDICARE

## 2025-04-24 VITALS — WEIGHT: 157 LBS | BODY MASS INDEX: 28.89 KG/M2 | HEIGHT: 62 IN

## 2025-04-24 DIAGNOSIS — S82.892A CLOSED FRACTURE OF LEFT ANKLE, INITIAL ENCOUNTER: Primary | ICD-10-CM

## 2025-04-24 PROCEDURE — 1123F ACP DISCUSS/DSCN MKR DOCD: CPT | Performed by: NURSE PRACTITIONER

## 2025-04-24 PROCEDURE — 99213 OFFICE O/P EST LOW 20 MIN: CPT | Performed by: NURSE PRACTITIONER

## 2025-04-24 NOTE — PROGRESS NOTES
[facility] Orthopedics & Sports Medicine      [facility] PHYSICIANS [facility] ORTHOPAEDICS AND SPORTS MEDICINE  [address] [place] [address] [address]  [place] [address]  Dept: [phone]  Dept Fax: [phone]    Chief Compliant:  Chief Complaint   Patient presents with    Ankle Pain     L Ankle Fx         History of Present Illness:  4/24/25:This is a pleasant 64 y.o. female who is here for follow-up of her left distal fibula avulsion fracture.  She states she stopped wearing the cam boot after about a week due to the boot causing her to have a fall at home.  States she has been wearing an Aircast which she feels provides some support to the ankle.  She continues to have pain on both sides of the ankle as well as swelling.  She states she has been doing everything she needs to do and has not been really resting and elevating the ankle.  She even reports being able to do yard work though with some discomfort.  Denies any other injuries to the ankle recently.  She is wearing compression socks.     Physical Exam: Left ankle: Skin intact.  There is some swelling about the medial and lateral malleoli.  She has tenderness about the medial and lateral malleoli.  No significant tenderness over syndesmosis.  She has good ankle range of motion.  Sensation intact to light touch.    Imaging: None today      Assessment and Plan:    This is a pleasant 64 y.o. female who has a left distal fibula avulsion fracture and ankle sprain.  Discussed etiology of symptoms and treatment options with the patient.  I did offer her a lace up ankle brace to provide some more support  since she cannot tolerate the cam boot.  I did discuss that ankle sprains can take several weeks sometimes months before completely resolves.  I did offer her formal physical therapy, however she declined at this time.  I will see patient back in 4 to 6 weeks for reevaluation.         Past History:    Current Outpatient Medications:

## 2025-05-29 ENCOUNTER — OFFICE VISIT (OUTPATIENT)
Age: 65
End: 2025-05-29
Payer: MEDICARE

## 2025-05-29 ENCOUNTER — HOSPITAL ENCOUNTER (OUTPATIENT)
Age: 65
Discharge: HOME | End: 2025-05-31
Payer: MEDICARE

## 2025-05-29 DIAGNOSIS — S62.652A CLOSED NONDISPLACED FRACTURE OF MIDDLE PHALANX OF RIGHT MIDDLE FINGER, INITIAL ENCOUNTER: ICD-10-CM

## 2025-05-29 DIAGNOSIS — M65.331 ACQUIRED TRIGGER FINGER OF RIGHT MIDDLE FINGER: Primary | ICD-10-CM

## 2025-05-29 DIAGNOSIS — M65.331 TRIGGER MIDDLE FINGER OF RIGHT HAND: ICD-10-CM

## 2025-05-29 PROCEDURE — 1123F ACP DISCUSS/DSCN MKR DOCD: CPT | Performed by: ORTHOPAEDIC SURGERY

## 2025-05-29 PROCEDURE — 73130 X-RAY EXAM OF HAND: CPT

## 2025-05-29 PROCEDURE — 99214 OFFICE O/P EST MOD 30 MIN: CPT | Performed by: ORTHOPAEDIC SURGERY

## 2025-05-29 NOTE — PROGRESS NOTES
[facility] Orthopedics & Sports Medicine      [facility] PHYSICIANS [facility] ORTHOPAEDICS AND SPORTS MEDICINE  [facility]  [place] [address]  Dept: [phone]  Dept Fax: [phone]    Chief Compliant:  Chief Complaint   Patient presents with    Follow-up        History of Present Illness:  5/29/25:This is a pleasant 64 y.o. female who is here for multiple issues.  First she is recovering from a left ankle lateral malleolus avulsion fracture.  She states her ankle is doing much better.  Her biggest complaint is pain in the right hand specifically the middle finger has been pain well ever since her fall.  She is about 6 weeks status post right middle finger A1 pulley release.  The fall was after the surgery.  She notes that this worsens the pain.  She also notes some chronic hip and knee pain for which she has been seen before at the [facility].    Physical Exam: The left ankle has no tenderness over the distal fibula, good range of motion, normal gait pattern in tennis shoes.    The right hand has some tenderness along the middle finger, slight limitation in range of motion, no deformity.  She has a well-healed incision over the middle finger A1 pulley.    Imaging: New x-rays of the right hand taken today were independently reviewed and show some callus formation on the volar aspect of the middle finger middle phalanx.  Otherwise no obvious fracture.      Assessment and Plan:    This is a pleasant 64 y.o. female who is here well from her left ankle lateral malleolus avulsion fracture.  Her right middle finger likely did have a small fracture to the middle phalanx but this is healing well.  No further treatment is necessary.  She status post A1 pulley release of that finger.  Continue stretching.  We discussed briefly her knee and hip.  She can make another appointment to have a more deep investigation.         Past History:    Current Outpatient Medications:

## 2025-07-01 ENCOUNTER — HOSPITAL ENCOUNTER (EMERGENCY)
Age: 65
Discharge: HOME | End: 2025-07-02
Attending: EMERGENCY MEDICINE
Payer: MEDICARE

## 2025-07-01 DIAGNOSIS — G89.29 ACUTE EXACERBATION OF CHRONIC LOW BACK PAIN: Primary | ICD-10-CM

## 2025-07-01 DIAGNOSIS — M54.50 ACUTE EXACERBATION OF CHRONIC LOW BACK PAIN: Primary | ICD-10-CM

## 2025-07-01 PROCEDURE — 99284 EMERGENCY DEPT VISIT MOD MDM: CPT

## 2025-07-01 PROCEDURE — 80048 BASIC METABOLIC PNL TOTAL CA: CPT

## 2025-07-01 PROCEDURE — 85025 COMPLETE CBC W/AUTO DIFF WBC: CPT

## 2025-07-01 RX ORDER — HYDROMORPHONE HYDROCHLORIDE 1 MG/ML
1 INJECTION, SOLUTION INTRAMUSCULAR; INTRAVENOUS; SUBCUTANEOUS ONCE
Status: COMPLETED | OUTPATIENT
Start: 2025-07-02 | End: 2025-07-02

## 2025-07-01 RX ORDER — LORAZEPAM 2 MG/ML
0.5 INJECTION INTRAMUSCULAR ONCE
Status: COMPLETED | OUTPATIENT
Start: 2025-07-02 | End: 2025-07-02

## 2025-07-01 ASSESSMENT — PAIN DESCRIPTION - FREQUENCY: FREQUENCY: INTERMITTENT

## 2025-07-01 ASSESSMENT — PAIN DESCRIPTION - LOCATION: LOCATION: BACK

## 2025-07-01 ASSESSMENT — PAIN SCALES - GENERAL: PAINLEVEL_OUTOF10: 10

## 2025-07-01 ASSESSMENT — PAIN - FUNCTIONAL ASSESSMENT: PAIN_FUNCTIONAL_ASSESSMENT: 0-10

## 2025-07-02 ENCOUNTER — APPOINTMENT (OUTPATIENT)
Dept: CT IMAGING | Age: 65
End: 2025-07-02
Payer: MEDICARE

## 2025-07-02 VITALS
RESPIRATION RATE: 16 BRPM | TEMPERATURE: 97.9 F | HEIGHT: 62 IN | SYSTOLIC BLOOD PRESSURE: 139 MMHG | DIASTOLIC BLOOD PRESSURE: 75 MMHG | HEART RATE: 53 BPM | BODY MASS INDEX: 28.52 KG/M2 | OXYGEN SATURATION: 92 % | WEIGHT: 155 LBS

## 2025-07-02 LAB
ANION GAP SERPL CALCULATED.3IONS-SCNC: 11 MMOL/L (ref 9–16)
BACTERIA URNS QL MICRO: ABNORMAL
BASOPHILS # BLD: 0.06 K/UL (ref 0–0.2)
BASOPHILS NFR BLD: 1 % (ref 0–2)
BILIRUB UR QL STRIP: ABNORMAL
BUN SERPL-MCNC: 15 MG/DL (ref 8–23)
CALCIUM SERPL-MCNC: 9.4 MG/DL (ref 8.8–10.2)
CHLORIDE SERPL-SCNC: 106 MMOL/L (ref 98–107)
CLARITY UR: CLEAR
CO2 SERPL-SCNC: 23 MMOL/L (ref 20–31)
COLOR UR: YELLOW
CREAT SERPL-MCNC: 1 MG/DL (ref 0.5–0.9)
EOSINOPHIL # BLD: 0.06 K/UL (ref 0–0.44)
EOSINOPHILS RELATIVE PERCENT: 1 % (ref 1–4)
EPI CELLS #/AREA URNS HPF: ABNORMAL /HPF (ref 0–5)
ERYTHROCYTE [DISTWIDTH] IN BLOOD BY AUTOMATED COUNT: 13.3 % (ref 11.8–14.4)
GFR, ESTIMATED: 64 ML/MIN/1.73M2
GLUCOSE SERPL-MCNC: 274 MG/DL (ref 82–115)
GLUCOSE UR STRIP-MCNC: ABNORMAL MG/DL
HCT VFR BLD AUTO: 39.8 % (ref 36.3–47.1)
HGB BLD-MCNC: 13.3 G/DL (ref 11.9–15.1)
HGB UR QL STRIP.AUTO: NEGATIVE
IMM GRANULOCYTES # BLD AUTO: 0 K/UL (ref 0–0.3)
IMM GRANULOCYTES NFR BLD: 0 %
KETONES UR STRIP-MCNC: ABNORMAL MG/DL
LEUKOCYTE ESTERASE UR QL STRIP: NEGATIVE
LYMPHOCYTES NFR BLD: 2.1 K/UL (ref 1.1–3.7)
LYMPHOCYTES RELATIVE PERCENT: 35 % (ref 24–43)
MCH RBC QN AUTO: 30.6 PG (ref 25.2–33.5)
MCHC RBC AUTO-ENTMCNC: 33.4 G/DL (ref 28.4–34.8)
MCV RBC AUTO: 91.7 FL (ref 82.6–102.9)
MONOCYTES NFR BLD: 0.48 K/UL (ref 0.1–1.2)
MONOCYTES NFR BLD: 8 % (ref 3–12)
MUCOUS THREADS URNS QL MICRO: ABNORMAL
NEUTROPHILS NFR BLD: 55 % (ref 36–65)
NEUTS SEG NFR BLD: 3.3 K/UL (ref 1.5–8.1)
NITRITE UR QL STRIP: NEGATIVE
NRBC BLD-RTO: 0 PER 100 WBC
PH UR STRIP: 6 [PH] (ref 5–8)
PLATELET # BLD AUTO: 153 K/UL (ref 138–453)
PMV BLD AUTO: 10 FL (ref 8.1–13.5)
POTASSIUM SERPL-SCNC: 4 MMOL/L (ref 3.7–5.3)
PROT UR STRIP-MCNC: NEGATIVE MG/DL
RBC # BLD AUTO: 4.34 M/UL (ref 3.95–5.11)
RBC #/AREA URNS HPF: ABNORMAL /HPF (ref 0–2)
SODIUM SERPL-SCNC: 140 MMOL/L (ref 136–145)
SP GR UR STRIP: 1.02 (ref 1–1.03)
UROBILINOGEN UR STRIP-ACNC: NORMAL EU/DL (ref 0–1)
WBC #/AREA URNS HPF: ABNORMAL /HPF (ref 0–5)
WBC OTHER # BLD: 6 K/UL (ref 3.5–11.3)

## 2025-07-02 PROCEDURE — 6370000000 HC RX 637 (ALT 250 FOR IP): Performed by: EMERGENCY MEDICINE

## 2025-07-02 PROCEDURE — 96374 THER/PROPH/DIAG INJ IV PUSH: CPT

## 2025-07-02 PROCEDURE — 81001 URINALYSIS AUTO W/SCOPE: CPT

## 2025-07-02 PROCEDURE — 96376 TX/PRO/DX INJ SAME DRUG ADON: CPT

## 2025-07-02 PROCEDURE — 6360000002 HC RX W HCPCS: Performed by: EMERGENCY MEDICINE

## 2025-07-02 PROCEDURE — 72131 CT LUMBAR SPINE W/O DYE: CPT

## 2025-07-02 PROCEDURE — 96375 TX/PRO/DX INJ NEW DRUG ADDON: CPT

## 2025-07-02 RX ORDER — HYDROCODONE BITARTRATE AND ACETAMINOPHEN 5; 325 MG/1; MG/1
1 TABLET ORAL EVERY 4 HOURS PRN
Qty: 10 TABLET | Refills: 0 | Status: SHIPPED | OUTPATIENT
Start: 2025-07-02 | End: 2025-07-09

## 2025-07-02 RX ORDER — PREDNISONE 20 MG/1
50 TABLET ORAL ONCE
Status: COMPLETED | OUTPATIENT
Start: 2025-07-02 | End: 2025-07-02

## 2025-07-02 RX ORDER — PREDNISONE 50 MG/1
50 TABLET ORAL DAILY
Qty: 4 TABLET | Refills: 0 | Status: SHIPPED | OUTPATIENT
Start: 2025-07-02 | End: 2025-07-06

## 2025-07-02 RX ORDER — DIPHENHYDRAMINE HCL 25 MG
25 TABLET ORAL ONCE
Status: COMPLETED | OUTPATIENT
Start: 2025-07-02 | End: 2025-07-02

## 2025-07-02 RX ADMIN — PREDNISONE 50 MG: 20 TABLET ORAL at 03:36

## 2025-07-02 RX ADMIN — DIPHENHYDRAMINE HCL 25 MG: 25 TABLET ORAL at 03:29

## 2025-07-02 RX ADMIN — Medication 0.5 MG: at 00:02

## 2025-07-02 RX ADMIN — HYDROMORPHONE HYDROCHLORIDE 0.5 MG: 1 INJECTION, SOLUTION INTRAMUSCULAR; INTRAVENOUS; SUBCUTANEOUS at 01:17

## 2025-07-02 RX ADMIN — HYDROMORPHONE HYDROCHLORIDE 1 MG: 1 INJECTION, SOLUTION INTRAMUSCULAR; INTRAVENOUS; SUBCUTANEOUS at 00:01

## 2025-07-02 RX ADMIN — PREDNISONE 50 MG: 20 TABLET ORAL at 01:11

## 2025-07-02 ASSESSMENT — PAIN DESCRIPTION - LOCATION
LOCATION: BACK;HIP
LOCATION: BACK;HIP

## 2025-07-02 ASSESSMENT — PAIN DESCRIPTION - ORIENTATION
ORIENTATION: LOWER;LEFT;RIGHT
ORIENTATION: LEFT;RIGHT;LOWER

## 2025-07-02 ASSESSMENT — PAIN DESCRIPTION - DESCRIPTORS
DESCRIPTORS: THROBBING
DESCRIPTORS: THROBBING

## 2025-07-02 ASSESSMENT — PAIN SCALES - GENERAL
PAINLEVEL_OUTOF10: 7
PAINLEVEL_OUTOF10: 10

## 2025-07-02 NOTE — ED PROVIDER NOTES
paternal grandmother is . She indicated that her paternal grandfather is . She indicated that her maternal uncle is alive. She indicated that her paternal uncle is alive.     SOCIAL HISTORY       Social History     Tobacco Use    Smoking status: Never    Smokeless tobacco: Never   Vaping Use    Vaping status: Never Used   Substance Use Topics    Alcohol use: No     Comment: last drink 17 years ago    Drug use: Never     PHYSICAL EXAM     INITIAL VITALS: /75   Pulse 53   Temp 97.9 °F (36.6 °C) (Oral)   Resp 16   Ht 1.575 m (5' 2\")   Wt 70.3 kg (155 lb)   SpO2 92%   BMI 28.35 kg/m²    Physical Exam  Constitutional:       Appearance: Lying in exam bed, looking very uncomfortable, however nontoxic-appearing  HENT:      Head: Normocephalic.   Eyes:      Conjunctiva/sclera: Conjunctivae normal.   Cardiovascular:      Rate and Rhythm: Regular rhythm.   Pulmonary:      Effort: Pulmonary effort is normal.   Abdominal:      General: Abdomen is flat.   Musculoskeletal:         Paraspinal lumbar tenderness  Neurological:      General: No focal deficit present.      Sensory: No sensory deficit (No saddle anesthesia. Sensory 5/5 lower extremity bilaterally.).      Motor: No weakness (Motor 5/5 lower extremities bilaterally.).     MEDICAL DECISION MAKING / ED COURSE:     1)  Number and Complexity of Problems  Problem List This Visit:  Back pain.    Differential Diagnosis: Muscle strain, sciatica, spinal stenosis, herniated disc.    Diagnoses Considered but Do Not Suspect: Cauda equina, epidural abscess, ankylosing spondylitis.    2)  Data Reviewed  Patient's EKG independently interpreted by me: N/A    Decision Rules/Scores utilized:  N/A    HEART SCORE: N/A, no chest pain.    NIH STROKE SCALE: N/A, no focal neurodeficits.  NIH Stroke Scale  NIH Stroke Scale Assessed: No  External Documents Reviewed: I have independently reviewed patient's previous medical records including labs, notes and

## 2025-07-02 NOTE — DISCHARGE INSTRUCTIONS
Return to this emergency room immediately if your symptoms persist, worsen or if new ones form.    Make sure you follow-up with your primary care doctor within the next 1-2 business days to discuss PT and/or MRI if symptoms do not improve.

## 2025-07-08 ENCOUNTER — APPOINTMENT (OUTPATIENT)
Dept: CT IMAGING | Age: 65
End: 2025-07-08
Payer: MEDICARE

## 2025-07-08 ENCOUNTER — HOSPITAL ENCOUNTER (EMERGENCY)
Age: 65
Discharge: HOME | End: 2025-07-08
Attending: EMERGENCY MEDICINE
Payer: MEDICARE

## 2025-07-08 ENCOUNTER — APPOINTMENT (OUTPATIENT)
Dept: GENERAL RADIOLOGY | Age: 65
End: 2025-07-08
Payer: MEDICARE

## 2025-07-08 VITALS
HEART RATE: 74 BPM | TEMPERATURE: 97.5 F | DIASTOLIC BLOOD PRESSURE: 64 MMHG | OXYGEN SATURATION: 96 % | RESPIRATION RATE: 20 BRPM | SYSTOLIC BLOOD PRESSURE: 115 MMHG

## 2025-07-08 DIAGNOSIS — K52.9 COLITIS: ICD-10-CM

## 2025-07-08 DIAGNOSIS — R55 SYNCOPE AND COLLAPSE: Primary | ICD-10-CM

## 2025-07-08 LAB
ANION GAP SERPL CALCULATED.3IONS-SCNC: 14 MMOL/L (ref 9–16)
BASOPHILS # BLD: 0.03 K/UL (ref 0–0.2)
BASOPHILS NFR BLD: 0 % (ref 0–2)
BUN SERPL-MCNC: 19 MG/DL (ref 8–23)
CALCIUM SERPL-MCNC: 9.7 MG/DL (ref 8.8–10.2)
CHLORIDE SERPL-SCNC: 101 MMOL/L (ref 98–107)
CO2 SERPL-SCNC: 22 MMOL/L (ref 20–31)
CREAT SERPL-MCNC: 1.1 MG/DL (ref 0.5–0.9)
EOSINOPHIL # BLD: 0.08 K/UL (ref 0–0.44)
EOSINOPHILS RELATIVE PERCENT: 1 % (ref 1–4)
ERYTHROCYTE [DISTWIDTH] IN BLOOD BY AUTOMATED COUNT: 13.2 % (ref 11.8–14.4)
GFR, ESTIMATED: 59 ML/MIN/1.73M2
GLUCOSE BLD-MCNC: 287 MG/DL
GLUCOSE BLD-MCNC: 287 MG/DL (ref 65–105)
GLUCOSE SERPL-MCNC: 261 MG/DL (ref 82–115)
HCT VFR BLD AUTO: 43.7 % (ref 36.3–47.1)
HGB BLD-MCNC: 15 G/DL (ref 11.9–15.1)
IMM GRANULOCYTES # BLD AUTO: 0.05 K/UL (ref 0–0.3)
IMM GRANULOCYTES NFR BLD: 1 %
LYMPHOCYTES NFR BLD: 2.6 K/UL (ref 1.1–3.7)
LYMPHOCYTES RELATIVE PERCENT: 35 % (ref 24–43)
MAGNESIUM SERPL-MCNC: 2.1 MG/DL (ref 1.6–2.4)
MCH RBC QN AUTO: 30.9 PG (ref 25.2–33.5)
MCHC RBC AUTO-ENTMCNC: 34.3 G/DL (ref 28.4–34.8)
MCV RBC AUTO: 89.9 FL (ref 82.6–102.9)
MONOCYTES NFR BLD: 0.49 K/UL (ref 0.1–1.2)
MONOCYTES NFR BLD: 7 % (ref 3–12)
NEUTROPHILS NFR BLD: 56 % (ref 36–65)
NEUTS SEG NFR BLD: 4.1 K/UL (ref 1.5–8.1)
NRBC BLD-RTO: 0 PER 100 WBC
PLATELET # BLD AUTO: 200 K/UL (ref 138–453)
PMV BLD AUTO: 9.5 FL (ref 8.1–13.5)
POTASSIUM SERPL-SCNC: 3.5 MMOL/L (ref 3.7–5.3)
RBC # BLD AUTO: 4.86 M/UL (ref 3.95–5.11)
SODIUM SERPL-SCNC: 137 MMOL/L (ref 136–145)
TROPONIN I SERPL HS-MCNC: <6 NG/L (ref 0–14)
WBC OTHER # BLD: 7.4 K/UL (ref 3.5–11.3)

## 2025-07-08 PROCEDURE — 99285 EMERGENCY DEPT VISIT HI MDM: CPT

## 2025-07-08 PROCEDURE — 6360000004 HC RX CONTRAST MEDICATION: Performed by: EMERGENCY MEDICINE

## 2025-07-08 PROCEDURE — 71045 X-RAY EXAM CHEST 1 VIEW: CPT

## 2025-07-08 PROCEDURE — 82947 ASSAY GLUCOSE BLOOD QUANT: CPT

## 2025-07-08 PROCEDURE — 93005 ELECTROCARDIOGRAM TRACING: CPT | Performed by: EMERGENCY MEDICINE

## 2025-07-08 PROCEDURE — 80048 BASIC METABOLIC PNL TOTAL CA: CPT

## 2025-07-08 PROCEDURE — 96374 THER/PROPH/DIAG INJ IV PUSH: CPT

## 2025-07-08 PROCEDURE — 83735 ASSAY OF MAGNESIUM: CPT

## 2025-07-08 PROCEDURE — 84484 ASSAY OF TROPONIN QUANT: CPT

## 2025-07-08 PROCEDURE — 74177 CT ABD & PELVIS W/CONTRAST: CPT

## 2025-07-08 PROCEDURE — 70450 CT HEAD/BRAIN W/O DYE: CPT

## 2025-07-08 PROCEDURE — 2580000003 HC RX 258: Performed by: EMERGENCY MEDICINE

## 2025-07-08 PROCEDURE — 6360000002 HC RX W HCPCS: Performed by: EMERGENCY MEDICINE

## 2025-07-08 PROCEDURE — 6370000000 HC RX 637 (ALT 250 FOR IP): Performed by: EMERGENCY MEDICINE

## 2025-07-08 PROCEDURE — 85025 COMPLETE CBC W/AUTO DIFF WBC: CPT

## 2025-07-08 PROCEDURE — 2500000003 HC RX 250 WO HCPCS: Performed by: EMERGENCY MEDICINE

## 2025-07-08 RX ORDER — METRONIDAZOLE 500 MG/1
500 TABLET ORAL ONCE
Status: COMPLETED | OUTPATIENT
Start: 2025-07-08 | End: 2025-07-08

## 2025-07-08 RX ORDER — IOPAMIDOL 755 MG/ML
75 INJECTION, SOLUTION INTRAVASCULAR
Status: COMPLETED | OUTPATIENT
Start: 2025-07-08 | End: 2025-07-08

## 2025-07-08 RX ORDER — DICYCLOMINE HCL 20 MG
20 TABLET ORAL 4 TIMES DAILY
Qty: 30 TABLET | Refills: 0 | Status: SHIPPED | OUTPATIENT
Start: 2025-07-08

## 2025-07-08 RX ORDER — 0.9 % SODIUM CHLORIDE 0.9 %
1000 INTRAVENOUS SOLUTION INTRAVENOUS ONCE
Status: COMPLETED | OUTPATIENT
Start: 2025-07-08 | End: 2025-07-08

## 2025-07-08 RX ORDER — METRONIDAZOLE 500 MG/1
500 TABLET ORAL 3 TIMES DAILY
Qty: 30 TABLET | Refills: 0 | Status: SHIPPED | OUTPATIENT
Start: 2025-07-08 | End: 2025-07-18

## 2025-07-08 RX ORDER — ONDANSETRON 4 MG/1
4 TABLET, ORALLY DISINTEGRATING ORAL 3 TIMES DAILY PRN
Qty: 21 TABLET | Refills: 0 | Status: SHIPPED | OUTPATIENT
Start: 2025-07-08

## 2025-07-08 RX ORDER — SODIUM CHLORIDE 0.9 % (FLUSH) 0.9 %
10 SYRINGE (ML) INJECTION ONCE
Status: COMPLETED | OUTPATIENT
Start: 2025-07-08 | End: 2025-07-08

## 2025-07-08 RX ORDER — 0.9 % SODIUM CHLORIDE 0.9 %
80 INTRAVENOUS SOLUTION INTRAVENOUS ONCE
Status: COMPLETED | OUTPATIENT
Start: 2025-07-08 | End: 2025-07-08

## 2025-07-08 RX ORDER — ONDANSETRON 2 MG/ML
4 INJECTION INTRAMUSCULAR; INTRAVENOUS ONCE
Status: COMPLETED | OUTPATIENT
Start: 2025-07-08 | End: 2025-07-08

## 2025-07-08 RX ADMIN — SODIUM CHLORIDE 80 ML: 9 INJECTION, SOLUTION INTRAVENOUS at 16:40

## 2025-07-08 RX ADMIN — SODIUM CHLORIDE, PRESERVATIVE FREE 10 ML: 5 INJECTION INTRAVENOUS at 16:40

## 2025-07-08 RX ADMIN — METRONIDAZOLE 500 MG: 500 TABLET ORAL at 18:49

## 2025-07-08 RX ADMIN — ONDANSETRON 4 MG: 2 INJECTION, SOLUTION INTRAMUSCULAR; INTRAVENOUS at 15:54

## 2025-07-08 RX ADMIN — SODIUM CHLORIDE 1000 ML: 0.9 INJECTION, SOLUTION INTRAVENOUS at 15:54

## 2025-07-08 RX ADMIN — IOPAMIDOL 75 ML: 755 INJECTION, SOLUTION INTRAVENOUS at 16:40

## 2025-07-08 ASSESSMENT — ENCOUNTER SYMPTOMS
ABDOMINAL PAIN: 1
SHORTNESS OF BREATH: 0
CONSTIPATION: 1
FACIAL SWELLING: 0
EYE PAIN: 0
EYE DISCHARGE: 0
CHEST TIGHTNESS: 0
BACK PAIN: 0
NAUSEA: 1
ABDOMINAL DISTENTION: 0

## 2025-07-08 ASSESSMENT — PAIN SCALES - GENERAL: PAINLEVEL_OUTOF10: 7

## 2025-07-08 ASSESSMENT — PAIN - FUNCTIONAL ASSESSMENT: PAIN_FUNCTIONAL_ASSESSMENT: 0-10

## 2025-07-08 NOTE — ED PROVIDER NOTES
DAILY AS DIRECTED    PANTOPRAZOLE (PROTONIX) 40 MG TABLET    Take 1 tablet by mouth every morning    PROAIR  (90 BASE) MCG/ACT INHALER        TRULICITY 0.75 MG/0.5ML SOAJ SC INJECTION        ZOLPIDEM (AMBIEN) 10 MG TABLET    Take 0.5 tablets by mouth nightly as needed for Sleep.     ALLERGIES     is allergic to compazine [prochlorperazine maleate], stelazine [trifluoperazine], thorazine [chlorpromazine], amoxicillin, augmentin [amoxicillin-pot clavulanate], bactrim [sulfamethoxazole-trimethoprim], ciprofloxacin, keflex [cephalexin], pcn [penicillins], and topamax [topiramate].  FAMILY HISTORY     She indicated that her mother is . She indicated that her father is alive. She indicated that both of her sisters are alive. She indicated that her brother is alive. She indicated that her maternal grandmother is . She indicated that her maternal grandfather is . She indicated that her paternal grandmother is . She indicated that her paternal grandfather is . She indicated that her maternal uncle is alive. She indicated that her paternal uncle is alive.     SOCIAL HISTORY       Social History     Tobacco Use    Smoking status: Never    Smokeless tobacco: Never   Vaping Use    Vaping status: Never Used   Substance Use Topics    Alcohol use: No     Comment: last drink 17 years ago    Drug use: Never     PHYSICAL EXAM     INITIAL VITALS: /64   Pulse 74   Temp 97.5 °F (36.4 °C)   Resp 20   SpO2 96%    Physical Exam  Vitals and nursing note reviewed.   Constitutional:       General: She is not in acute distress.     Appearance: She is well-developed. She is not diaphoretic.   HENT:      Head: Normocephalic and atraumatic.   Eyes:      Pupils: Pupils are equal, round, and reactive to light.   Cardiovascular:      Rate and Rhythm: Normal rate and regular rhythm.   Pulmonary:      Effort: Pulmonary effort is normal.      Breath sounds: Normal breath sounds.   Abdominal:

## 2025-07-08 NOTE — ED NOTES
Pt presents to ED after syncopal episode in the bathroom in the ED. Pt was initially a visitor with her . Pt was attempted to have a BM and was straining. Pt stated she became diaphoretic and weak. Pt stated she lowered herself to the ground in the bathroom. Pt called out for help. Pt stated she has been having constipation for over a week and having abdominal cramping and nausea. Pt stated she did not hit her head.

## 2025-07-09 LAB
EKG ATRIAL RATE: 79 BPM
EKG P AXIS: 37 DEGREES
EKG P-R INTERVAL: 158 MS
EKG Q-T INTERVAL: 394 MS
EKG QRS DURATION: 82 MS
EKG QTC CALCULATION (BAZETT): 451 MS
EKG R AXIS: 55 DEGREES
EKG T AXIS: 63 DEGREES
EKG VENTRICULAR RATE: 79 BPM

## 2025-07-09 PROCEDURE — 93010 ELECTROCARDIOGRAM REPORT: CPT | Performed by: INTERNAL MEDICINE

## (undated) DEVICE — BLANKET WRM W29.9XL79.1IN UP BODY FORC AIR MISTRAL-AIR

## (undated) DEVICE — MHPB HAND AND FOOT PACK: Brand: MEDLINE INDUSTRIES, INC.

## (undated) DEVICE — SYRINGE 20ML LL S/C 50

## (undated) DEVICE — LIQUIBAND RAPID ADHESIVE 36/CS 0.8ML: Brand: MEDLINE

## (undated) DEVICE — GOWN,AURORA,NON-REINFORCED,2XL: Brand: MEDLINE

## (undated) DEVICE — PREP-RESISTANT MARKER W/ RULER: Brand: MEDLINE INDUSTRIES, INC.

## (undated) DEVICE — YANKAUER,FLEXIBLE HANDLE,REGLR CAPACITY: Brand: MEDLINE INDUSTRIES, INC.

## (undated) DEVICE — INTENDED FOR TISSUE SEPARATION, AND OTHER PROCEDURES THAT REQUIRE A SHARP SURGICAL BLADE TO PUNCTURE OR CUT.: Brand: BARD-PARKER ® CARBON RIB-BACK BLADES

## (undated) DEVICE — APPLICATOR MEDICATED 26 CC SOLUTION HI LT ORNG CHLORAPREP

## (undated) DEVICE — DRESSING PETRO W3XL8IN OIL EMUL N ADH GZ KNIT IMPREG CELOS

## (undated) DEVICE — PIN EXCHANGE 3.2MM

## (undated) DEVICE — SUTURE VCRL SZ 0 L36IN ABSRB UD L36MM CT-1 1/2 CIR J946H

## (undated) DEVICE — GLOVE SURG SZ 65 THK91MIL LTX FREE SYN POLYISOPRENE

## (undated) DEVICE — 60-7070-103 TRNQT,DPSB,PLC RED: Brand: MEDLINE RENEWAL

## (undated) DEVICE — C-ARM: Brand: UNBRANDED

## (undated) DEVICE — SHEET, ORTHO, SPLIT, STERILE: Brand: MEDLINE

## (undated) DEVICE — DRAPE,U/ SHT,SPLIT,PLAS,STERIL: Brand: MEDLINE

## (undated) DEVICE — 3M™ IOBAN™ 2 ANTIMICROBIAL INCISE DRAPE 6650EZ: Brand: IOBAN™ 2

## (undated) DEVICE — GLOVE SURG SZ 8 L12IN THK75MIL DK GRN LTX FREE

## (undated) DEVICE — SUTURE MONOCRYL SZ 4 0 L18IN ABSRB UD P 3 3 8 CIR REV CUT NDL MCP494G

## (undated) DEVICE — GAUZE, BORDER, 3"X6", 1.5"X4"PAD, STERIL: Brand: MEDLINE INDUSTRIES, INC.

## (undated) DEVICE — SUTURE VCRL SZ 2-0 L36IN ABSRB UD L36MM CT-1 1/2 CIR J945H

## (undated) DEVICE — PIN GUIDE 3.2MM

## (undated) DEVICE — ADHESIVE SKIN CLOSURE TOP 36 CC HI VISC DERMBND MINI

## (undated) DEVICE — SURGICAL PROCEDURE KIT BASIN MAJ SET UP

## (undated) DEVICE — BNDG,ELSTC,MATRIX,STRL,2"X5YD,LF,HOOK&LP: Brand: MEDLINE

## (undated) DEVICE — SUTURE MCRYL SZ 4-0 L27IN ABSRB UD L19MM PS-2 1/2 CIR PRIM Y426H

## (undated) DEVICE — PROTECTOR EYE PT SELF ADH NS OPT GRD LF

## (undated) DEVICE — HYPODERMIC SAFETY NEEDLE: Brand: MAGELLAN

## (undated) DEVICE — SYRINGE MED 10ML LUERLOCK TIP W/O SFTY DISP

## (undated) DEVICE — INSERT CUSHION HEAD PRONEVIEW

## (undated) DEVICE — C-ARMOR C-ARM EQUIPMENT COVERS CLEAR STERILE UNIVERSAL FIT 12 PER CASE: Brand: C-ARMOR

## (undated) DEVICE — SOLUTION IRRIG 500ML 0.9% SOD CHLO USP POUR PLAS BTL

## (undated) DEVICE — CLOTH PRE OP W9XL10.5IN 2% CHG FRAGRANCE RNS FREE READYPREP

## (undated) DEVICE — GLOVE SURG SZ 85 L12IN FNGR THK79MIL GRN LTX FREE

## (undated) DEVICE — GLOVE ORANGE PI 8   MSG9080

## (undated) DEVICE — STRAP,POSITIONING,KNEE/BODY,FOAM,4X60": Brand: MEDLINE

## (undated) DEVICE — Device

## (undated) DEVICE — 1010 S-DRAPE TOWEL DRAPE 10/BX: Brand: STERI-DRAPE™

## (undated) DEVICE — DRAPE,REIN 53X77,STERILE: Brand: MEDLINE

## (undated) DEVICE — GLOVE SURG SZ 85 CRM LTX FREE POLYISOPRENE POLYMER BEAD ANTI